# Patient Record
Sex: MALE | Race: WHITE | Employment: OTHER | URBAN - METROPOLITAN AREA
[De-identification: names, ages, dates, MRNs, and addresses within clinical notes are randomized per-mention and may not be internally consistent; named-entity substitution may affect disease eponyms.]

---

## 2018-11-18 ENCOUNTER — APPOINTMENT (OUTPATIENT)
Dept: CT IMAGING | Age: 58
DRG: 872 | End: 2018-11-18
Attending: INTERNAL MEDICINE
Payer: COMMERCIAL

## 2018-11-18 ENCOUNTER — APPOINTMENT (OUTPATIENT)
Dept: GENERAL RADIOLOGY | Age: 58
DRG: 872 | End: 2018-11-18
Attending: EMERGENCY MEDICINE
Payer: COMMERCIAL

## 2018-11-18 ENCOUNTER — HOSPITAL ENCOUNTER (INPATIENT)
Age: 58
LOS: 5 days | Discharge: HOME HEALTH | DRG: 872 | End: 2018-11-23
Attending: EMERGENCY MEDICINE | Admitting: INTERNAL MEDICINE
Payer: COMMERCIAL

## 2018-11-18 DIAGNOSIS — R68.89 RIGORS: Primary | ICD-10-CM

## 2018-11-18 PROBLEM — J18.9 PNEUMONIA: Status: ACTIVE | Noted: 2018-11-18

## 2018-11-18 PROBLEM — R65.10 SIRS (SYSTEMIC INFLAMMATORY RESPONSE SYNDROME) (HCC): Status: ACTIVE | Noted: 2018-11-18

## 2018-11-18 LAB
ALBUMIN SERPL-MCNC: 3.2 G/DL (ref 3.5–5)
ALBUMIN/GLOB SERPL: 0.7 {RATIO} (ref 1.1–2.2)
ALP SERPL-CCNC: 129 U/L (ref 45–117)
ALT SERPL-CCNC: 35 U/L (ref 12–78)
AMPHET UR QL SCN: NEGATIVE
ANION GAP SERPL CALC-SCNC: 6 MMOL/L (ref 5–15)
APPEARANCE UR: ABNORMAL
AST SERPL-CCNC: 24 U/L (ref 15–37)
BACTERIA URNS QL MICRO: NEGATIVE /HPF
BARBITURATES UR QL SCN: NEGATIVE
BASOPHILS # BLD: 0 K/UL (ref 0–0.1)
BASOPHILS NFR BLD: 0 % (ref 0–1)
BENZODIAZ UR QL: NEGATIVE
BILIRUB SERPL-MCNC: 0.4 MG/DL (ref 0.2–1)
BILIRUB UR QL: NEGATIVE
BUN SERPL-MCNC: 12 MG/DL (ref 6–20)
BUN/CREAT SERPL: 10 (ref 12–20)
CALCIUM SERPL-MCNC: 8.6 MG/DL (ref 8.5–10.1)
CANNABINOIDS UR QL SCN: NEGATIVE
CHLORIDE SERPL-SCNC: 104 MMOL/L (ref 97–108)
CO2 SERPL-SCNC: 30 MMOL/L (ref 21–32)
COCAINE UR QL SCN: NEGATIVE
COLOR UR: ABNORMAL
COMMENT, HOLDF: NORMAL
CREAT SERPL-MCNC: 1.23 MG/DL (ref 0.7–1.3)
DIFFERENTIAL METHOD BLD: ABNORMAL
DRUG SCRN COMMENT,DRGCM: NORMAL
EOSINOPHIL # BLD: 0.2 K/UL (ref 0–0.4)
EOSINOPHIL NFR BLD: 1 % (ref 0–7)
EPITH CASTS URNS QL MICRO: ABNORMAL /LPF
ERYTHROCYTE [DISTWIDTH] IN BLOOD BY AUTOMATED COUNT: 14.5 % (ref 11.5–14.5)
FLUAV AG NPH QL IA: NEGATIVE
FLUBV AG NOSE QL IA: NEGATIVE
GLOBULIN SER CALC-MCNC: 4.5 G/DL (ref 2–4)
GLUCOSE SERPL-MCNC: 105 MG/DL (ref 65–100)
GLUCOSE UR STRIP.AUTO-MCNC: NEGATIVE MG/DL
HCT VFR BLD AUTO: 47.1 % (ref 36.6–50.3)
HGB BLD-MCNC: 15.2 G/DL (ref 12.1–17)
HGB UR QL STRIP: NEGATIVE
IMM GRANULOCYTES # BLD: 0 K/UL (ref 0–0.04)
IMM GRANULOCYTES NFR BLD AUTO: 0 % (ref 0–0.5)
KETONES UR QL STRIP.AUTO: NEGATIVE MG/DL
LACTATE BLD-SCNC: 2.49 MMOL/L (ref 0.4–2)
LACTATE SERPL-SCNC: 2.2 MMOL/L (ref 0.4–2)
LEUKOCYTE ESTERASE UR QL STRIP.AUTO: NEGATIVE
LYMPHOCYTES # BLD: 1.1 K/UL (ref 0.8–3.5)
LYMPHOCYTES NFR BLD: 7 % (ref 12–49)
MCH RBC QN AUTO: 29.3 PG (ref 26–34)
MCHC RBC AUTO-ENTMCNC: 32.3 G/DL (ref 30–36.5)
MCV RBC AUTO: 90.9 FL (ref 80–99)
METHADONE UR QL: NEGATIVE
MONOCYTES # BLD: 0.2 K/UL (ref 0–1)
MONOCYTES NFR BLD: 1 % (ref 5–13)
NEUTS SEG # BLD: 14.2 K/UL (ref 1.8–8)
NEUTS SEG NFR BLD: 91 % (ref 32–75)
NITRITE UR QL STRIP.AUTO: NEGATIVE
NRBC # BLD: 0 K/UL (ref 0–0.01)
NRBC BLD-RTO: 0 PER 100 WBC
OPIATES UR QL: NEGATIVE
PCP UR QL: NEGATIVE
PH UR STRIP: 5 [PH] (ref 5–8)
PLATELET # BLD AUTO: 234 K/UL (ref 150–400)
PMV BLD AUTO: 9.1 FL (ref 8.9–12.9)
POTASSIUM SERPL-SCNC: 4.4 MMOL/L (ref 3.5–5.1)
PROT SERPL-MCNC: 7.7 G/DL (ref 6.4–8.2)
PROT UR STRIP-MCNC: 30 MG/DL
RBC # BLD AUTO: 5.18 M/UL (ref 4.1–5.7)
RBC #/AREA URNS HPF: ABNORMAL /HPF (ref 0–5)
RBC MORPH BLD: ABNORMAL
SAMPLES BEING HELD,HOLD: NORMAL
SODIUM SERPL-SCNC: 140 MMOL/L (ref 136–145)
SP GR UR REFRACTOMETRY: 1.02 (ref 1–1.03)
SPERM URNS QL MICRO: PRESENT
TSH SERPL DL<=0.05 MIU/L-ACNC: 1.43 UIU/ML (ref 0.36–3.74)
UA: UC IF INDICATED,UAUC: ABNORMAL
UROBILINOGEN UR QL STRIP.AUTO: 0.2 EU/DL (ref 0.2–1)
WBC # BLD AUTO: 15.7 K/UL (ref 4.1–11.1)
WBC URNS QL MICRO: ABNORMAL /HPF (ref 0–4)

## 2018-11-18 PROCEDURE — 81001 URINALYSIS AUTO W/SCOPE: CPT

## 2018-11-18 PROCEDURE — 84443 ASSAY THYROID STIM HORMONE: CPT

## 2018-11-18 PROCEDURE — 87040 BLOOD CULTURE FOR BACTERIA: CPT

## 2018-11-18 PROCEDURE — 36415 COLL VENOUS BLD VENIPUNCTURE: CPT

## 2018-11-18 PROCEDURE — 65660000000 HC RM CCU STEPDOWN

## 2018-11-18 PROCEDURE — 83605 ASSAY OF LACTIC ACID: CPT

## 2018-11-18 PROCEDURE — 71046 X-RAY EXAM CHEST 2 VIEWS: CPT

## 2018-11-18 PROCEDURE — 80307 DRUG TEST PRSMV CHEM ANLYZR: CPT

## 2018-11-18 PROCEDURE — 96365 THER/PROPH/DIAG IV INF INIT: CPT

## 2018-11-18 PROCEDURE — 87077 CULTURE AEROBIC IDENTIFY: CPT

## 2018-11-18 PROCEDURE — 87804 INFLUENZA ASSAY W/OPTIC: CPT

## 2018-11-18 PROCEDURE — 99285 EMERGENCY DEPT VISIT HI MDM: CPT

## 2018-11-18 PROCEDURE — 74011250636 HC RX REV CODE- 250/636: Performed by: EMERGENCY MEDICINE

## 2018-11-18 PROCEDURE — 85025 COMPLETE CBC W/AUTO DIFF WBC: CPT

## 2018-11-18 PROCEDURE — 96361 HYDRATE IV INFUSION ADD-ON: CPT

## 2018-11-18 PROCEDURE — 93005 ELECTROCARDIOGRAM TRACING: CPT

## 2018-11-18 PROCEDURE — 71275 CT ANGIOGRAPHY CHEST: CPT

## 2018-11-18 PROCEDURE — 74011000258 HC RX REV CODE- 258: Performed by: EMERGENCY MEDICINE

## 2018-11-18 PROCEDURE — 80053 COMPREHEN METABOLIC PANEL: CPT

## 2018-11-18 PROCEDURE — 87186 SC STD MICRODIL/AGAR DIL: CPT

## 2018-11-18 PROCEDURE — 74011636320 HC RX REV CODE- 636/320: Performed by: EMERGENCY MEDICINE

## 2018-11-18 RX ORDER — VANCOMYCIN 2 GRAM/500 ML IN 0.9 % SODIUM CHLORIDE INTRAVENOUS
2 ONCE
Status: DISCONTINUED | OUTPATIENT
Start: 2018-11-18 | End: 2018-11-18 | Stop reason: SDUPTHER

## 2018-11-18 RX ORDER — SODIUM CHLORIDE 0.9 % (FLUSH) 0.9 %
10 SYRINGE (ML) INJECTION
Status: COMPLETED | OUTPATIENT
Start: 2018-11-18 | End: 2018-11-19

## 2018-11-18 RX ORDER — ALBUTEROL SULFATE 0.83 MG/ML
1.25 SOLUTION RESPIRATORY (INHALATION)
Status: DISCONTINUED | OUTPATIENT
Start: 2018-11-18 | End: 2018-11-23 | Stop reason: HOSPADM

## 2018-11-18 RX ORDER — SODIUM CHLORIDE 9 MG/ML
50 INJECTION, SOLUTION INTRAVENOUS
Status: COMPLETED | OUTPATIENT
Start: 2018-11-18 | End: 2018-11-19

## 2018-11-18 RX ORDER — VANCOMYCIN 2 GRAM/500 ML IN 0.9 % SODIUM CHLORIDE INTRAVENOUS
2000
Status: COMPLETED | OUTPATIENT
Start: 2018-11-18 | End: 2018-11-19

## 2018-11-18 RX ORDER — ENOXAPARIN SODIUM 100 MG/ML
40 INJECTION SUBCUTANEOUS EVERY 12 HOURS
Status: DISCONTINUED | OUTPATIENT
Start: 2018-11-18 | End: 2018-11-23 | Stop reason: HOSPADM

## 2018-11-18 RX ORDER — ACETAMINOPHEN 325 MG/1
650 TABLET ORAL
Status: DISCONTINUED | OUTPATIENT
Start: 2018-11-18 | End: 2018-11-23 | Stop reason: HOSPADM

## 2018-11-18 RX ORDER — SODIUM CHLORIDE 0.9 % (FLUSH) 0.9 %
5-10 SYRINGE (ML) INJECTION AS NEEDED
Status: DISCONTINUED | OUTPATIENT
Start: 2018-11-18 | End: 2018-11-23

## 2018-11-18 RX ORDER — ONDANSETRON 2 MG/ML
4 INJECTION INTRAMUSCULAR; INTRAVENOUS
Status: DISCONTINUED | OUTPATIENT
Start: 2018-11-18 | End: 2018-11-23 | Stop reason: HOSPADM

## 2018-11-18 RX ORDER — SODIUM CHLORIDE 9 MG/ML
126 INJECTION, SOLUTION INTRAVENOUS CONTINUOUS
Status: DISCONTINUED | OUTPATIENT
Start: 2018-11-18 | End: 2018-11-23 | Stop reason: HOSPADM

## 2018-11-18 RX ADMIN — VANCOMYCIN HYDROCHLORIDE 2000 MG: 10 INJECTION, POWDER, LYOPHILIZED, FOR SOLUTION INTRAVENOUS at 23:55

## 2018-11-18 RX ADMIN — IOPAMIDOL 100 ML: 755 INJECTION, SOLUTION INTRAVENOUS at 23:55

## 2018-11-18 RX ADMIN — CEFTRIAXONE 1 G: 1 INJECTION, POWDER, FOR SOLUTION INTRAMUSCULAR; INTRAVENOUS at 22:46

## 2018-11-18 RX ADMIN — SODIUM CHLORIDE 1000 ML: 900 INJECTION, SOLUTION INTRAVENOUS at 22:47

## 2018-11-18 RX ADMIN — SODIUM CHLORIDE 1000 ML: 900 INJECTION, SOLUTION INTRAVENOUS at 21:59

## 2018-11-19 LAB
ALBUMIN SERPL-MCNC: 2.8 G/DL (ref 3.5–5)
ALBUMIN/GLOB SERPL: 0.7 {RATIO} (ref 1.1–2.2)
ALP SERPL-CCNC: 103 U/L (ref 45–117)
ALT SERPL-CCNC: 31 U/L (ref 12–78)
ANION GAP SERPL CALC-SCNC: 3 MMOL/L (ref 5–15)
AST SERPL-CCNC: 25 U/L (ref 15–37)
ATRIAL RATE: 88 BPM
BASOPHILS # BLD: 0.1 K/UL (ref 0–0.1)
BASOPHILS NFR BLD: 0 % (ref 0–1)
BILIRUB SERPL-MCNC: 0.4 MG/DL (ref 0.2–1)
BUN SERPL-MCNC: 11 MG/DL (ref 6–20)
BUN/CREAT SERPL: 10 (ref 12–20)
CALCIUM SERPL-MCNC: 7.6 MG/DL (ref 8.5–10.1)
CALCULATED P AXIS, ECG09: 28 DEGREES
CALCULATED R AXIS, ECG10: -11 DEGREES
CHLORIDE SERPL-SCNC: 109 MMOL/L (ref 97–108)
CO2 SERPL-SCNC: 27 MMOL/L (ref 21–32)
CREAT SERPL-MCNC: 1.07 MG/DL (ref 0.7–1.3)
CRP SERPL HS-MCNC: >9.5 MG/L
DIAGNOSIS, 93000: NORMAL
DIFFERENTIAL METHOD BLD: ABNORMAL
EOSINOPHIL # BLD: 0.2 K/UL (ref 0–0.4)
EOSINOPHIL NFR BLD: 1 % (ref 0–7)
ERYTHROCYTE [DISTWIDTH] IN BLOOD BY AUTOMATED COUNT: 14.8 % (ref 11.5–14.5)
ERYTHROCYTE [SEDIMENTATION RATE] IN BLOOD: 12 MM/HR (ref 0–20)
GLOBULIN SER CALC-MCNC: 4.2 G/DL (ref 2–4)
GLUCOSE SERPL-MCNC: 102 MG/DL (ref 65–100)
HCT VFR BLD AUTO: 44.1 % (ref 36.6–50.3)
HGB BLD-MCNC: 14.4 G/DL (ref 12.1–17)
IMM GRANULOCYTES # BLD: 0.1 K/UL (ref 0–0.04)
IMM GRANULOCYTES NFR BLD AUTO: 1 % (ref 0–0.5)
LACTATE SERPL-SCNC: 1.1 MMOL/L (ref 0.4–2)
LYMPHOCYTES # BLD: 1.5 K/UL (ref 0.8–3.5)
LYMPHOCYTES NFR BLD: 12 % (ref 12–49)
MCH RBC QN AUTO: 29.3 PG (ref 26–34)
MCHC RBC AUTO-ENTMCNC: 32.7 G/DL (ref 30–36.5)
MCV RBC AUTO: 89.6 FL (ref 80–99)
MONOCYTES # BLD: 0.7 K/UL (ref 0–1)
MONOCYTES NFR BLD: 5 % (ref 5–13)
NEUTS SEG # BLD: 10.6 K/UL (ref 1.8–8)
NEUTS SEG NFR BLD: 81 % (ref 32–75)
NRBC # BLD: 0 K/UL (ref 0–0.01)
NRBC BLD-RTO: 0 PER 100 WBC
P-R INTERVAL, ECG05: 132 MS
PLATELET # BLD AUTO: 204 K/UL (ref 150–400)
PMV BLD AUTO: 9.5 FL (ref 8.9–12.9)
POTASSIUM SERPL-SCNC: 4.2 MMOL/L (ref 3.5–5.1)
PROT SERPL-MCNC: 7 G/DL (ref 6.4–8.2)
Q-T INTERVAL, ECG07: 362 MS
QRS DURATION, ECG06: 132 MS
QTC CALCULATION (BEZET), ECG08: 438 MS
RBC # BLD AUTO: 4.92 M/UL (ref 4.1–5.7)
SODIUM SERPL-SCNC: 139 MMOL/L (ref 136–145)
VENTRICULAR RATE, ECG03: 88 BPM
WBC # BLD AUTO: 13 K/UL (ref 4.1–11.1)

## 2018-11-19 PROCEDURE — 80053 COMPREHEN METABOLIC PANEL: CPT

## 2018-11-19 PROCEDURE — 74011250636 HC RX REV CODE- 250/636: Performed by: INTERNAL MEDICINE

## 2018-11-19 PROCEDURE — 85025 COMPLETE CBC W/AUTO DIFF WBC: CPT

## 2018-11-19 PROCEDURE — 94760 N-INVAS EAR/PLS OXIMETRY 1: CPT

## 2018-11-19 PROCEDURE — 36415 COLL VENOUS BLD VENIPUNCTURE: CPT

## 2018-11-19 PROCEDURE — 83605 ASSAY OF LACTIC ACID: CPT

## 2018-11-19 PROCEDURE — 74011250636 HC RX REV CODE- 250/636: Performed by: EMERGENCY MEDICINE

## 2018-11-19 PROCEDURE — 65660000000 HC RM CCU STEPDOWN

## 2018-11-19 PROCEDURE — 85652 RBC SED RATE AUTOMATED: CPT

## 2018-11-19 PROCEDURE — 74011636320 HC RX REV CODE- 636/320: Performed by: EMERGENCY MEDICINE

## 2018-11-19 PROCEDURE — 86141 C-REACTIVE PROTEIN HS: CPT

## 2018-11-19 PROCEDURE — 74011000258 HC RX REV CODE- 258: Performed by: INTERNAL MEDICINE

## 2018-11-19 RX ORDER — IBUPROFEN 200 MG
CAPSULE ORAL
COMMUNITY
End: 2018-11-23

## 2018-11-19 RX ORDER — VANCOMYCIN 1.75 GRAM/500 ML IN 0.9 % SODIUM CHLORIDE INTRAVENOUS
1750
Status: DISCONTINUED | OUTPATIENT
Start: 2018-11-19 | End: 2018-11-21

## 2018-11-19 RX ADMIN — SODIUM CHLORIDE 125 ML/HR: 900 INJECTION, SOLUTION INTRAVENOUS at 17:05

## 2018-11-19 RX ADMIN — SODIUM CHLORIDE 50 ML/HR: 900 INJECTION, SOLUTION INTRAVENOUS at 00:37

## 2018-11-19 RX ADMIN — VANCOMYCIN HYDROCHLORIDE 1750 MG: 10 INJECTION, POWDER, LYOPHILIZED, FOR SOLUTION INTRAVENOUS at 09:21

## 2018-11-19 RX ADMIN — Medication 10 ML: at 00:37

## 2018-11-19 RX ADMIN — ENOXAPARIN SODIUM 40 MG: 40 INJECTION, SOLUTION INTRAVENOUS; SUBCUTANEOUS at 00:02

## 2018-11-19 RX ADMIN — ENOXAPARIN SODIUM 40 MG: 40 INJECTION, SOLUTION INTRAVENOUS; SUBCUTANEOUS at 22:50

## 2018-11-19 RX ADMIN — CEFTRIAXONE 1 G: 1 INJECTION, POWDER, FOR SOLUTION INTRAMUSCULAR; INTRAVENOUS at 22:21

## 2018-11-19 RX ADMIN — SODIUM CHLORIDE 125 ML/HR: 900 INJECTION, SOLUTION INTRAVENOUS at 01:38

## 2018-11-19 RX ADMIN — ENOXAPARIN SODIUM 40 MG: 40 INJECTION, SOLUTION INTRAVENOUS; SUBCUTANEOUS at 12:54

## 2018-11-19 RX ADMIN — SODIUM CHLORIDE 125 ML/HR: 900 INJECTION, SOLUTION INTRAVENOUS at 12:57

## 2018-11-19 NOTE — PROGRESS NOTES
Hospitalist Progress Note NAME: Mirian Talley :  1960 MRN:  [id number] This patient is at above high risk of deterioration based on documented presenting clinical data, comorbid conditions, high risk of adverse events and current acute care course. Assessment / Plan: 
SIRS, unclear etiology 
-recent dental work places him at risk for bacteremia and the presence of rigors worry for IE is real 
-follow cultures -LENKA prn 
-lactic dropping to <2 but CRP was >9.5 -up oob to moving 
-follow carefully 
-noted normal cta chest, cxr 
-ecg with RBBB 
-culture if fevers return. Code status: Full Prophylaxis: Lovenox Recommended Disposition: Home w/Family Medical Decision Making Today · Acute or chronic illness that poses a threat to life or bodily function · I have reviewed the flowsheet and previous days notes · One or more chronic illnesses with severe exacerbation, progression or side effects of treatment · Review and order of Clinical lab tests · Discuss case with Specialist MD 
 
Subjective: Chief Complaint / Reason for Physician Visit \"I am feeling well\". Discussed with RN events overnight. No further chills, rigors. No fevers/chills/n/v/d. Review of Systems: 
Symptom Y/N Comments  Symptom Y/N Comments Fever/Chills n   Chest Pain n   
Poor Appetite n   Edema Cough n   Abdominal Pain n   
Sputum    Joint Pain SOB/SOLORZANO n   Pruritis/Rash Nausea/vomit n   Tolerating PT/OT Diarrhea n   Tolerating Diet y Constipation    Other Could NOT obtain due to:   
 
Objective: VITALS:  
Last 24hrs VS reviewed since prior progress note. Most recent are: 
Patient Vitals for the past 24 hrs: 
 Temp Pulse Resp BP SpO2  
18 1624 98.7 °F (37.1 °C) 79 18 128/64 98 % 18 0755 98.8 °F (37.1 °C) 80 19 110/47 97 % 18 0537  81 18  99 % 18 0530  77 19  99 % 18 0430  71 14  100 % 18 0400  75 19  98 % 11/19/18 0300  70 19 101/46 97 % 11/19/18 0200    111/74 98 % 11/19/18 0145 97.8 °F (36.6 °C)   109/51 98 % 11/19/18 0144     99 % 11/19/18 0143 98 °F (36.7 °C)   105/58 99 % 11/18/18 2259 98.4 °F (36.9 °C)   120/78   
11/18/18 2125  98   98 % 11/18/18 2115 99.3 °F (37.4 °C)   132/82  Intake/Output Summary (Last 24 hours) at 11/19/2018 1656 Last data filed at 11/19/2018 1254 Gross per 24 hour Intake 3133.75 ml Output  Net 3133.75 ml PHYSICAL EXAM: 
General: WD, obese m Alert, cooperative, no acute distress   
EENT:  EOMI. Anicteric sclerae. MM dry Resp:  CTA bilaterally, no wheezing or rales. No accessory muscle use CV:  Regular  rhythm,  No edema GI:  Soft, Non distended, Non tender.  +Bowel sounds Neurologic:  Alert and oriented X 3, normal speech Psych:   Good insight. Not anxious nor agitated Skin:  no rashes or ulcers. No jaundice Reviewed most current lab test results and cultures  YES Reviewed most current radiology test results   YES Review and summation of old records today    NO Reviewed patient's current orders and MAR    YES 
PMH/SH reviewed - no change compared to H&P 
________________________________________________________________________ Care Plan discussed with: 
  Comments Patient x Discussed with patient in room. POC discussed. Questions answered (25 Family RN x 5 Care Manager Consultant:    
                  Multidiciplinary team rounds were held today with , nursing, pharmacist and clinical coordinator. Patient's plan of care was discussed; medications were reviewed and discharge planning was addressed. ________________________________________________________________________ Total NON critical care TIME:  35   Minutes Total CRITICAL CARE TIME Spent:   Minutes non procedure based. I have provided critical care time.   During this entire length of time I was immediately available to the patient. The reason for providing this level of medical care was due to a critical illness that impaired one or more vital organ systems, such that there was a high probability of imminent or life threatening deterioration in the patient's condition. This care involved high complexity decision making which includes reviewing the patient's past medical records, current laboratory results, and actual Xray films in order to assess, support vital system function, and to treat this degree of vital organ system failure, and to prevent further life threatening deterioration of the patients condition. Comments >50% of visit spent in counseling and coordination of care x See above  
________________________________________________________________________ Procedures: see electronic medical records for all procedures/Xrays and details which were not copied into this note but were reviewed prior to creation of Plan. LABS: 
Recent Labs 11/19/18 
0350 11/18/18 
1627 WBC 13.0* 15.7* HGB 14.4 15.2 HCT 44.1 47.1  234 Recent Labs 11/19/18 
0350 11/18/18 
1627  140  
K 4.2 4.4  
* 104 CO2 27 30 BUN 11 12 CREA 1.07 1.23  
* 105* CA 7.6* 8.6 Recent Labs 11/19/18 
0350 11/18/18 
1627 SGOT 25 24 ALT 31 35  129* TBILI 0.4 0.4 TP 7.0 7.7 ALB 2.8* 3.2*  
GLOB 4.2* 4.5* No results for input(s): INR, PTP, APTT in the last 72 hours. No lab exists for component: INREXT No results for input(s): FE, TIBC, PSAT, FERR in the last 72 hours. No results found for: FOL, RBCF No results for input(s): PH, PCO2, PO2 in the last 72 hours. No results for input(s): PHI, PO2I, PCO2I in the last 72 hours. No results for input(s): CPK, CKNDX, TROIQ in the last 72 hours. No lab exists for component: CPKMB No results found for: CHOL, CHOLX, CHLST, CHOLV, HDL, LDL, LDLC, DLDLP, TGLX, TRIGL, TRIGP, CHHD, CHHDX No results found for: Alexis Barnard Lab Results Component Value Date/Time Color YELLOW/STRAW 11/18/2018 04:15 PM  
 Appearance CLOUDY (A) 11/18/2018 04:15 PM  
 Specific gravity 1.016 11/18/2018 04:15 PM  
 pH (UA) 5.0 11/18/2018 04:15 PM  
 Protein 30 (A) 11/18/2018 04:15 PM  
 Glucose NEGATIVE  11/18/2018 04:15 PM  
 Ketone NEGATIVE  11/18/2018 04:15 PM  
 Bilirubin NEGATIVE  11/18/2018 04:15 PM  
 Urobilinogen 0.2 11/18/2018 04:15 PM  
 Nitrites NEGATIVE  11/18/2018 04:15 PM  
 Leukocyte Esterase NEGATIVE  11/18/2018 04:15 PM  
 Epithelial cells FEW 11/18/2018 04:15 PM  
 Bacteria NEGATIVE  11/18/2018 04:15 PM  
 WBC 0-4 11/18/2018 04:15 PM  
 RBC 0-5 11/18/2018 04:15 PM  
 
 
RADIOGRAPHIC STUDIES: 
CXR Results  (Last 48 hours)  
          
 11/18/18 2014  XR CHEST PA LAT Final result Impression:  IMPRESSION:  
   
Nonspecific opacity in the lateral aspect of the lower right lung may represent  
a small amount of pleural fluid or pleural thickening. There is no focal  
airspace opacity. Narrative:  EXAM:  XR CHEST PA LAT INDICATION:  Shaking chills. COMPARISON: 2/6/2007 TECHNIQUE: PA and lateral chest views FINDINGS: The cardiomediastinal contours are stable. The pulmonary vasculature  
is within normal limits. There is nonspecific opacity in the lateral aspect of the right lower lung. The  
lungs and pleural spaces are otherwise clear. There is no pneumothorax. The  
visualized bones and upper abdomen are age-appropriate. CT Results  (Last 48 hours)  
          
 11/19/18 0037  CTA CHEST W OR W WO CONT Final result Impression:  IMPRESSION:   
1. No acute pulmonary embolism or other acute abnormality in the chest. A benign  
right pleural reflection account the peripheral density seen on chest  
radiograph. 2. Hepatic steatosis. Narrative:  EXAM:  CT angiography chest  
   
INDICATION:  Chills and shaking all over. COMPARISON: Chest radiographs 11/18/2018 TECHNIQUE: Helical thin section chest CT following uneventful intravenous  
administration of nonionic contrast according to departmental PE protocol. Coronal and sagittal reformats were performed. 3D/MIP post processing was  
performed. CT dose reduction was achieved through use of a standardized protocol  
tailored for this examination and automatic exposure control for dose  
modulation. FINDINGS: This is a good quality study for the evaluation of pulmonary embolism  
to the first subsegmental arterial level. There is no pulmonary embolism to this  
level. The visualized thyroid gland is unremarkable. The aorta and main pulmonary  
artery are normal in caliber. Cardiac size is within normal limits. No  
pericardial effusion. No lymphadenopathy by imaging size criteria. A benign right pleural reflection account for the peripheral density seen on  
chest radiograph. There is no suspicious lung nodule, mass, or consolidation. There is no pleural effusion or pneumothorax. The central airways are clear. The liver is diffusely low in attenuation. There is no acute abnormality in the  
visualized upper abdomen. There are degenerative changes in the spine. An  
hemangioma is noted in the T7 vertebral body. Echo Results  (Last 48 hours) None VENOUS DOPPLER results  (Last 48 hours) None CULTURES: 
 
No results found for: SDES Lab Results Component Value Date/Time Culture result: (A) 11/18/2018 09:48 PM  
  GRAM NEGATIVE RODS GROWING IN 2 OF 4 BOTTLES DRAWN (SITES=L HAND AND L AC) Culture result: REMAINING BOTTLE(S) HAS/HAVE NO GROWTH SO FAR 11/18/2018 09:48 PM  
  
 
 
Signed: Fredis Mary MD 
 
This note will not be viewable in [address]

## 2018-11-19 NOTE — CONSULTS
Consult    NAME: Brent Desai   :  1960   MRN:  [id number]     Date/Time:  2018 11:32 AM    Patient PCP: Chares Cockayne, MD  ________________________________________________________________________     Assessment:     1. Recent dental work last week, rigors, leukocytosis, +blood culture  2. Prior negative stress test  3. Sinus with RBBB, left axis  4. , retired , walks        Plan:     Recent dental work, bacteremia asked to perform LENKA. All risk/benefits/alternatives discussed with patient and agrees to proceed. [x]        High complexity decision making was performed        Subjective:   CHIEF COMPLAINT: Rigors    HISTORY OF PRESENT ILLNESS:       Colt Jones is a 60 y.o.  male presents ambulatory to the ED with cc of gradual onset chills and tremors, x day while at home-.pt takes no meds. Otherwise with no sig past medical history. no history of drug use.non smoker. Non drinker.    Pt reports that tremors were \"uncontrolled\"  And lasted for ~30 to 45 minutes.  Pt reports that he was at baseline prior to episode.    Pt reports that he is currently fatigued by tremor episode.  Pt reports during episode he was having diaphoresis and SOB. denies HA, or nausea. Pt reports having dental fillings done on 2018.  Pt denies fever, hx of blood clots, or recent travel. No c/p no   No uti sx.      Currently in stretcher, feels ok, no chest pain, no dyspnea, no edema. We were asked to consult for work up and evaluation of the above problems. No past medical history on file. No past surgical history on file. No Known Allergies   Meds:  See below  Social History     Tobacco Use    Smoking status: Not on file   Substance Use Topics    Alcohol use: Not on file      No family history on file. REVIEW OF SYSTEMS:     []         Unable to obtain  ROS due to ---   [x]         Total of 12 systems reviewed as follows:     Total of 12 systems reviewed as follows:       POSITIVE= Bold text  Negative = normal text  General:  fever, chills, sweats, generalized weakness, weight loss/gain,      loss of appetite   Eyes:    blurred vision, eye pain, loss of vision, double vision  ENT:    rhinorrhea, pharyngitis   Respiratory:   cough, sputum production, SOB, SOLORZANO, wheezing, pleuritic pain   Cardiology:   chest pain, palpitations, orthopnea, PND, edema, syncope   Gastrointestinal:  abdominal pain , N/V, diarrhea, dysphagia, constipation, bleeding   Genitourinary:  frequency, urgency, dysuria, hematuria, incontinence   Muskuloskeletal :  arthralgia, myalgia, back pain  Hematology:  easy bruising, nose or gum bleeding, lymphadenopathy   Dermatological: rash, ulceration, pruritis, color change / jaundice  Endocrine:   hot flashes or polydipsia   Neurological:  headache, dizziness, confusion, focal weakness, paresthesia,     Speech difficulties, memory loss, gait difficulty  Psychological: Feelings of anxiety, depression, agitation    Objective:      Physical Exam:    Last 24hrs VS reviewed since prior progress note. Most recent are:    Visit Vitals  /47 (BP 1 Location: Right arm, BP Patient Position: At rest)   Pulse 80   Temp 98.8 °F (37.1 °C)   Resp 19   Ht 5' 6\" (1.676 m)   Wt 137.8 kg (303 lb 12.7 oz)   SpO2 97%   BMI 49.03 kg/m²       Intake/Output Summary (Last 24 hours) at 11/19/2018 1132  Last data filed at 11/19/2018 5240  Gross per 24 hour   Intake 2893.75 ml   Output    Net 2893.75 ml        General Appearance: Well developed, well nourished, alert & oriented x 3,    no acute distress. Ears/Nose/Mouth/Throat: Pupils equal and round, Hearing grossly normal.  Neck: Supple. JVP within normal limits. Carotids good upstrokes, with no bruit. Chest: Lungs clear to auscultation bilaterally. Cardiovascular: Regular rate and rhythm, S1S2 normal, no murmur, rubs, gallops. Abdomen: Soft, non-tender, bowel sounds are active. No organomegaly.   Extremities: No edema bilaterally. Femoral pulses +2, Distal Pulses +1. Skin: Warm and dry. Neuro: CN II-XII grossly intact, Strength and sensation grossly intact. Data:      Prior to Admission medications    Not on File       Recent Results (from the past 24 hour(s))   URINALYSIS W/ REFLEX CULTURE    Collection Time: 11/18/18  4:15 PM   Result Value Ref Range    Color YELLOW/STRAW      Appearance CLOUDY (A) CLEAR      Specific gravity 1.016 1.003 - 1.030      pH (UA) 5.0 5.0 - 8.0      Protein 30 (A) NEG mg/dL    Glucose NEGATIVE  NEG mg/dL    Ketone NEGATIVE  NEG mg/dL    Bilirubin NEGATIVE  NEG      Blood NEGATIVE  NEG      Urobilinogen 0.2 0.2 - 1.0 EU/dL    Nitrites NEGATIVE  NEG      Leukocyte Esterase NEGATIVE  NEG      WBC 0-4 0 - 4 /hpf    RBC 0-5 0 - 5 /hpf    Epithelial cells FEW FEW /lpf    Bacteria NEGATIVE  NEG /hpf    UA:UC IF INDICATED CULTURE NOT INDICATED BY UA RESULT CNI      Spermatozoa PRESENT     CBC WITH AUTOMATED DIFF    Collection Time: 11/18/18  4:27 PM   Result Value Ref Range    WBC 15.7 (H) 4.1 - 11.1 K/uL    RBC 5.18 4.10 - 5.70 M/uL    HGB 15.2 12.1 - 17.0 g/dL    HCT 47.1 36.6 - 50.3 %    MCV 90.9 80.0 - 99.0 FL    MCH 29.3 26.0 - 34.0 PG    MCHC 32.3 30.0 - 36.5 g/dL    RDW 14.5 11.5 - 14.5 %    PLATELET 300 360 - 606 K/uL    MPV 9.1 8.9 - 12.9 FL    NRBC 0.0 0  WBC    ABSOLUTE NRBC 0.00 0.00 - 0.01 K/uL    NEUTROPHILS 91 (H) 32 - 75 %    LYMPHOCYTES 7 (L) 12 - 49 %    MONOCYTES 1 (L) 5 - 13 %    EOSINOPHILS 1 0 - 7 %    BASOPHILS 0 0 - 1 %    IMMATURE GRANULOCYTES 0 0.0 - 0.5 %    ABS. NEUTROPHILS 14.2 (H) 1.8 - 8.0 K/UL    ABS. LYMPHOCYTES 1.1 0.8 - 3.5 K/UL    ABS. MONOCYTES 0.2 0.0 - 1.0 K/UL    ABS. EOSINOPHILS 0.2 0.0 - 0.4 K/UL    ABS. BASOPHILS 0.0 0.0 - 0.1 K/UL    ABS. IMM.  GRANS. 0.0 0.00 - 0.04 K/UL    DF AUTOMATED      RBC COMMENTS NORMOCYTIC, NORMOCHROMIC     METABOLIC PANEL, COMPREHENSIVE    Collection Time: 11/18/18  4:27 PM   Result Value Ref Range    Sodium 140 136 - 145 mmol/L    Potassium 4.4 3.5 - 5.1 mmol/L    Chloride 104 97 - 108 mmol/L    CO2 30 21 - 32 mmol/L    Anion gap 6 5 - 15 mmol/L    Glucose 105 (H) 65 - 100 mg/dL    BUN 12 6 - 20 MG/DL    Creatinine 1.23 0.70 - 1.30 MG/DL    BUN/Creatinine ratio 10 (L) 12 - 20      GFR est AA >60 >60 ml/min/1.73m2    GFR est non-AA >60 >60 ml/min/1.73m2    Calcium 8.6 8.5 - 10.1 MG/DL    Bilirubin, total 0.4 0.2 - 1.0 MG/DL    ALT (SGPT) 35 12 - 78 U/L    AST (SGOT) 24 15 - 37 U/L    Alk. phosphatase 129 (H) 45 - 117 U/L    Protein, total 7.7 6.4 - 8.2 g/dL    Albumin 3.2 (L) 3.5 - 5.0 g/dL    Globulin 4.5 (H) 2.0 - 4.0 g/dL    A-G Ratio 0.7 (L) 1.1 - 2.2     TSH 3RD GENERATION    Collection Time: 11/18/18  4:27 PM   Result Value Ref Range    TSH 1.43 0.36 - 3.74 uIU/mL   INFLUENZA A & B AG (RAPID TEST)    Collection Time: 11/18/18  8:00 PM   Result Value Ref Range    Influenza A Antigen NEGATIVE  NEG      Influenza B Antigen NEGATIVE  NEG     POC LACTIC ACID    Collection Time: 11/18/18  9:42 PM   Result Value Ref Range    Lactic Acid (POC) 2.49 (HH) 0.40 - 2.00 mmol/L   CULTURE, BLOOD, PAIRED    Collection Time: 11/18/18  9:48 PM   Result Value Ref Range    Special Requests: NO SPECIAL REQUESTS      Culture result:        ONE OF FOUR BOTTLES HAS BEEN FLAGGED POSITIVE BY INSTRUMENT. BOTTLE HAS BEEN SENT TO [facility] TO ASSESS FOR POSSIBLE GROWTH. REMAINING BOTTLE(S) HAS/HAVE NO GROWTH SO FAR   LACTIC ACID    Collection Time: 11/18/18  9:57 PM   Result Value Ref Range    Lactic acid 2.2 (HH) 0.4 - 2.0 MMOL/L   SAMPLES BEING HELD    Collection Time: 11/18/18  9:59 PM   Result Value Ref Range    SAMPLES BEING HELD RED TOP     COMMENT        Add-on orders for these samples will be processed based on acceptable specimen integrity and analyte stability, which may vary by analyte.    EKG, 12 LEAD, INITIAL    Collection Time: 11/18/18 10:22 PM   Result Value Ref Range    Ventricular Rate 88 BPM    Atrial Rate 88 BPM    P-R Interval 132 ms    QRS Duration 132 ms    Q-T Interval 362 ms    QTC Calculation (Bezet) 438 ms    Calculated P Axis 28 degrees    Calculated R Axis -11 degrees    Diagnosis       Normal sinus rhythm  Right bundle branch block  No previous ECGs available  Confirmed by Anahy Gaona ([id number]) on 11/19/2018 9:12:05 AM     DRUG SCREEN, URINE    Collection Time: 11/18/18 10:59 PM   Result Value Ref Range    AMPHETAMINES NEGATIVE  NEG      BARBITURATES NEGATIVE  NEG      BENZODIAZEPINES NEGATIVE  NEG      COCAINE NEGATIVE  NEG      METHADONE NEGATIVE  NEG      OPIATES NEGATIVE  NEG      PCP(PHENCYCLIDINE) NEGATIVE  NEG      THC (TH-CANNABINOL) NEGATIVE  NEG      Drug screen comment (NOTE)    LACTIC ACID    Collection Time: 11/19/18  1:36 AM   Result Value Ref Range    Lactic acid 1.1 0.4 - 2.0 MMOL/L   CRP, HIGH SENSITIVITY    Collection Time: 11/19/18  1:36 AM   Result Value Ref Range    CRP, High sensitivity >9.5 mg/L   SED RATE (ESR)    Collection Time: 11/19/18  1:36 AM   Result Value Ref Range    Sed rate, automated 12 0 - 20 mm/hr   METABOLIC PANEL, COMPREHENSIVE    Collection Time: 11/19/18  3:50 AM   Result Value Ref Range    Sodium 139 136 - 145 mmol/L    Potassium 4.2 3.5 - 5.1 mmol/L    Chloride 109 (H) 97 - 108 mmol/L    CO2 27 21 - 32 mmol/L    Anion gap 3 (L) 5 - 15 mmol/L    Glucose 102 (H) 65 - 100 mg/dL    BUN 11 6 - 20 MG/DL    Creatinine 1.07 0.70 - 1.30 MG/DL    BUN/Creatinine ratio 10 (L) 12 - 20      GFR est AA >60 >60 ml/min/1.73m2    GFR est non-AA >60 >60 ml/min/1.73m2    Calcium 7.6 (L) 8.5 - 10.1 MG/DL    Bilirubin, total 0.4 0.2 - 1.0 MG/DL    ALT (SGPT) 31 12 - 78 U/L    AST (SGOT) 25 15 - 37 U/L    Alk.  phosphatase 103 45 - 117 U/L    Protein, total 7.0 6.4 - 8.2 g/dL    Albumin 2.8 (L) 3.5 - 5.0 g/dL    Globulin 4.2 (H) 2.0 - 4.0 g/dL    A-G Ratio 0.7 (L) 1.1 - 2.2     CBC WITH AUTOMATED DIFF    Collection Time: 11/19/18  3:50 AM   Result Value Ref Range    WBC 13.0 (H) 4.1 - 11.1 K/uL    RBC 4.92 4.10 - 5.70 M/uL    HGB 14.4 12.1 - 17.0 g/dL    HCT 44.1 36.6 - 50.3 %    MCV 89.6 80.0 - 99.0 FL    MCH 29.3 26.0 - 34.0 PG    MCHC 32.7 30.0 - 36.5 g/dL    RDW 14.8 (H) 11.5 - 14.5 %    PLATELET 725 655 - 013 K/uL    MPV 9.5 8.9 - 12.9 FL    NRBC 0.0 0  WBC    ABSOLUTE NRBC 0.00 0.00 - 0.01 K/uL    NEUTROPHILS 81 (H) 32 - 75 %    LYMPHOCYTES 12 12 - 49 %    MONOCYTES 5 5 - 13 %    EOSINOPHILS 1 0 - 7 %    BASOPHILS 0 0 - 1 %    IMMATURE GRANULOCYTES 1 (H) 0.0 - 0.5 %    ABS. NEUTROPHILS 10.6 (H) 1.8 - 8.0 K/UL    ABS. LYMPHOCYTES 1.5 0.8 - 3.5 K/UL    ABS. MONOCYTES 0.7 0.0 - 1.0 K/UL    ABS. EOSINOPHILS 0.2 0.0 - 0.4 K/UL    ABS. BASOPHILS 0.1 0.0 - 0.1 K/UL    ABS. IMM.  GRANS. 0.1 (H) 0.00 - 0.04 K/UL    DF AUTOMATED

## 2018-11-19 NOTE — PROGRESS NOTES
Bedside shift change report given to Etienne Bautista (oncoming nurse) by Fredis Rudolph (offgoing nurse). Report included the following information SBAR, Kardex, ED Summary, Procedure Summary, Intake/Output, MAR and Cardiac Rhythm NSR right BBB.

## 2018-11-19 NOTE — PROGRESS NOTES
TRANSFER - IN REPORT: 
 
Verbal report received from Stephen Churchill RN(name) on Calla Loss  being received from ER(unit) for routine progression of care Report consisted of patients Situation, Background, Assessment and  
Recommendations(SBAR). Information from the following report(s) SBAR, Kardex, Intake/Output, MAR and Recent Results was reviewed with the receiving nurse. Opportunity for questions and clarification was provided. Assessment completed upon patients arrival to unit and care assumed.

## 2018-11-19 NOTE — H&P
Hospitalist Admission NoteNAME: Viral Peace :  1960 MRN:  [id number] Date/Time:  2018 10:43 PM 
 
Patient PCP: Leonarda Bragg MD 
______________________________________________________________________ Given the patient's current clinical presentation, I have a high level of concern for decompensation if discharged from the emergency department. Complex decision making was performed, which includes reviewing the patient's available past medical records, laboratory results, and x-ray films. My assessment of this patient's clinical condition and my plan of care is as follows. Assessment / Plan: 
 
Chills:with sirs cxr Nonspecific opacity in the lateral aspect of the lower right lung may representa small amount of pleural fluid or pleural thickening. There is no focal 
airspace opacity. 
-u/a  Clear/ no sx Lactic acid 2.49 Blood cx done in the er x2 Get cta r/o pe /penumonia Cont w vanco/rocephin for now Get howard /cardiology to see since er raised the possibility of IE- recent dental w/u Trend la/follow blood cx Tylenol prn Check ua tox Code Status: Full Code Surrogate Decision Maker: DVT Prophylaxis: Lovenox GI Prophylaxis: not indicated Baseline: Ambulatory Subjective: CHIEF COMPLAINT: chills HISTORY OF PRESENT ILLNESS:    
Fran Agudelo is a 60 y.o.  male presents ambulatory to the ED with cc of gradual onset chills and tremors, x day while at home-.pt takes no meds. Otherwise with no sig past medical history. no history of drug use.non smoker. Non drinker. Pt reports that tremors were \"uncontrolled\"  And lasted for ~30 to 45 minutes. Pt reports that he was at baseline prior to episode. Pt reports that he is currently fatigued by tremor episode. Pt reports during episode he was having diaphoresis and SOB. denies HA, or nausea.  Pt reports having dental fillings done on 11/17/2018. Pt denies fever, hx of blood clots, or recent travel. No c/p no No uti sx. We were asked to admit for work up and evaluation of the above problems. No past medical history on file. No past surgical history on file. Social History Tobacco Use  Smoking status: Not on file Substance Use Topics  Alcohol use: Not on file No family history on file. No Known Allergies Prior to Admission medications Not on File REVIEW OF SYSTEMS:    
I am not able to complete the review of systems because: The patient is intubated and sedated The patient has altered mental status due to his acute medical problems The patient has baseline aphasia from prior stroke(s) The patient has baseline dementia and is not reliable historian The patient is in acute medical distress and unable to provide information Total of 12 systems reviewed as follows:   
   POSITIVE= underlined text  Negative = text not underlined General:  fever, chills, sweats, generalized weakness, weight loss/gain,  
   loss of appetite Eyes:    blurred vision, eye pain, loss of vision, double vision ENT:    rhinorrhea, pharyngitis Respiratory:   cough, sputum production, SOB, SOLORZANO, wheezing, pleuritic pain  
Cardiology:   chest pain, palpitations, orthopnea, PND, edema, syncope Gastrointestinal:  abdominal pain , N/V, diarrhea, dysphagia, constipation, bleeding Genitourinary:  frequency, urgency, dysuria, hematuria, incontinence Muskuloskeletal :  arthralgia, myalgia, back pain Hematology:  easy bruising, nose or gum bleeding, lymphadenopathy Dermatological: rash, ulceration, pruritis, color change / jaundice Endocrine:   hot flashes or polydipsia Neurological:  headache, dizziness, confusion, focal weakness, paresthesia, Speech difficulties, memory loss, gait difficulty Psychological: Feelings of anxiety, depression, agitation Objective: VITALS:   
 Visit Vitals /82 (BP 1 Location: Left arm, BP Patient Position: Sitting) Pulse 98 Temp 99.3 °F (37.4 °C) Resp 18 Ht 5' 6\" (1.676 m) Wt 137.8 kg (303 lb 12.7 oz) SpO2 98% BMI 49.03 kg/m² PHYSICAL EXAM: 
 
General:    Alert, OBESE  cooperative, no distress, appears stated age. HEENT: Atraumatic, anicteric sclerae, pink conjunctivae no lesions No oral ulcers, mucosa moist, throat clear, dentition fair Neck:  Supple, symmetrical,  thyroid: non tender no meningismus Lungs:   Clear to auscultation bilaterally. No Wheezing or Rhonchi. No rales. Chest wall:  No tenderness  No Accessory muscle use. Heart:   Tachy  rhythm,  No  murmur   No edema Abdomen:   Soft, non-tender. Not distended. Bowel sounds normal 
Extremities: No cyanosis. No clubbing,   
  Skin turgor normal, Capillary refill normal, Radial dial pulse 2+ Skin:     Not pale. Not Jaundiced  No rashes  No hemorrhages in nail beds. No lesions on hands/toes- 
Psych:  Good insight. Not depressed. Not anxious or agitated. Neurologic: EOMs intact. No facial asymmetry. No aphasia or slurred speech. Symmetrical strength, Sensation grossly intact. Alert and oriented X 4.  
 
_______________________________________________________________________ Care Plan discussed with: 
  Comments Patient x Family RN x Care Manager Consultant:  x   
_______________________________________________________________________ Expected  Disposition:  
Home with Family x HH/PT/OT/RN   
SNF/LTC   
JESSI   
________________________________________________________________________ TOTAL TIME:  90  Minutes Critical Care Provided     Minutes non procedure based Comments  
 xx Reviewed previous records  
>50% of visit spent in counseling and coordination of care x Discussion with patient and/or family and questions answered 
  
 
________________________________________________________________________ Signed: Neno Haynes MD 
 
Procedures: see electronic medical records for all procedures/Xrays and details which were not copied into this note but were reviewed prior to creation of Plan. LAB DATA REVIEWED:   
Recent Results (from the past 24 hour(s)) URINALYSIS W/ REFLEX CULTURE Collection Time: 11/18/18  4:15 PM  
Result Value Ref Range Color YELLOW/STRAW Appearance CLOUDY (A) CLEAR Specific gravity 1.016 1.003 - 1.030    
 pH (UA) 5.0 5.0 - 8.0 Protein 30 (A) NEG mg/dL Glucose NEGATIVE  NEG mg/dL Ketone NEGATIVE  NEG mg/dL Bilirubin NEGATIVE  NEG Blood NEGATIVE  NEG Urobilinogen 0.2 0.2 - 1.0 EU/dL Nitrites NEGATIVE  NEG Leukocyte Esterase NEGATIVE  NEG    
 WBC 0-4 0 - 4 /hpf  
 RBC 0-5 0 - 5 /hpf Epithelial cells FEW FEW /lpf Bacteria NEGATIVE  NEG /hpf  
 UA:UC IF INDICATED CULTURE NOT INDICATED BY UA RESULT CNI Spermatozoa PRESENT    
CBC WITH AUTOMATED DIFF Collection Time: 11/18/18  4:27 PM  
Result Value Ref Range WBC 15.7 (H) 4.1 - 11.1 K/uL  
 RBC 5.18 4.10 - 5.70 M/uL  
 HGB 15.2 12.1 - 17.0 g/dL HCT 47.1 36.6 - 50.3 % MCV 90.9 80.0 - 99.0 FL  
 MCH 29.3 26.0 - 34.0 PG  
 MCHC 32.3 30.0 - 36.5 g/dL  
 RDW 14.5 11.5 - 14.5 % PLATELET 692 650 - 361 K/uL MPV 9.1 8.9 - 12.9 FL  
 NRBC 0.0 0  WBC ABSOLUTE NRBC 0.00 0.00 - 0.01 K/uL NEUTROPHILS 91 (H) 32 - 75 % LYMPHOCYTES 7 (L) 12 - 49 % MONOCYTES 1 (L) 5 - 13 % EOSINOPHILS 1 0 - 7 % BASOPHILS 0 0 - 1 % IMMATURE GRANULOCYTES 0 0.0 - 0.5 % ABS. NEUTROPHILS 14.2 (H) 1.8 - 8.0 K/UL  
 ABS. LYMPHOCYTES 1.1 0.8 - 3.5 K/UL  
 ABS. MONOCYTES 0.2 0.0 - 1.0 K/UL  
 ABS. EOSINOPHILS 0.2 0.0 - 0.4 K/UL  
 ABS. BASOPHILS 0.0 0.0 - 0.1 K/UL  
 ABS. IMM. GRANS. 0.0 0.00 - 0.04 K/UL  
 DF AUTOMATED    
 RBC COMMENTS NORMOCYTIC, NORMOCHROMIC METABOLIC PANEL, COMPREHENSIVE Collection Time: 11/18/18  4:27 PM  
Result Value Ref Range Sodium 140 136 - 145 mmol/L Potassium 4.4 3.5 - 5.1 mmol/L Chloride 104 97 - 108 mmol/L  
 CO2 30 21 - 32 mmol/L Anion gap 6 5 - 15 mmol/L Glucose 105 (H) 65 - 100 mg/dL BUN 12 6 - 20 MG/DL Creatinine 1.23 0.70 - 1.30 MG/DL  
 BUN/Creatinine ratio 10 (L) 12 - 20 GFR est AA >60 >60 ml/min/1.73m2 GFR est non-AA >60 >60 ml/min/1.73m2 Calcium 8.6 8.5 - 10.1 MG/DL Bilirubin, total 0.4 0.2 - 1.0 MG/DL  
 ALT (SGPT) 35 12 - 78 U/L  
 AST (SGOT) 24 15 - 37 U/L Alk. phosphatase 129 (H) 45 - 117 U/L Protein, total 7.7 6.4 - 8.2 g/dL Albumin 3.2 (L) 3.5 - 5.0 g/dL Globulin 4.5 (H) 2.0 - 4.0 g/dL A-G Ratio 0.7 (L) 1.1 - 2.2 TSH 3RD GENERATION Collection Time: 11/18/18  4:27 PM  
Result Value Ref Range TSH 1.43 0.36 - 3.74 uIU/mL INFLUENZA A & B AG (RAPID TEST) Collection Time: 11/18/18  8:00 PM  
Result Value Ref Range Influenza A Antigen NEGATIVE  NEG Influenza B Antigen NEGATIVE  NEG    
POC LACTIC ACID Collection Time: 11/18/18  9:42 PM  
Result Value Ref Range Lactic Acid (POC) 2.49 (HH) 0.40 - 2.00 mmol/L  
LACTIC ACID Collection Time: 11/18/18  9:57 PM  
Result Value Ref Range Lactic acid 2.2 (HH) 0.4 - 2.0 MMOL/L  
SAMPLES BEING HELD Collection Time: 11/18/18  9:59 PM  
Result Value Ref Range SAMPLES BEING HELD RED TOP   
 COMMENT Add-on orders for these samples will be processed based on acceptable specimen integrity and analyte stability, which may vary by analyte. EKG, 12 LEAD, INITIAL Collection Time: 11/18/18 10:22 PM  
Result Value Ref Range Ventricular Rate 88 BPM  
 Atrial Rate 88 BPM  
 P-R Interval 132 ms QRS Duration 132 ms Q-T Interval 362 ms QTC Calculation (Bezet) 438 ms Calculated P Axis 28 degrees Calculated R Axis -11 degrees Diagnosis Normal sinus rhythm Right bundle branch block No previous ECGs available

## 2018-11-19 NOTE — ED PROVIDER NOTES
EMERGENCY DEPARTMENT HISTORY AND PHYSICAL EXAM 
 
 
Date: 11/18/2018 Patient Name: Nicki Rice History of Presenting Illness Chief Complaint Patient presents with  Shaking  
  pt reports he was sleeping in recliner today and felt like he had chills, went to bathroom and began shaking \"all over\" History Provided By: Patient HPI: Nicki Rice, 60 y.o. male presents ambulatory to the ED with cc of gradual onset chills and tremors, onset 1500 today after using bathroom. Pt reports that tremors were \"uncontrolled\"  And lasted for ~30 to 45 minutes. Pt reports that he was at baseline prior to episode. Pt reports that he is currently fatigued by tremor episode. Pt reports during episode he was having diaphoresis and SOB. Pt reports last time he went to see PCP for a physical was several years ago. Pt reports abdominal surgery when he was 24 y.o., and skin CA removal ~1 year ago. Pt reports having dental procedure done on 11/17/2018. Pt denies fever, hx of blood clots, recent travel, IVDU. He is feeling better at the time of my evaluation There are no other complaints, changes, or physical findings at this time. PCP: Shahla Donato MD 
 
 
 
Past History Past Medical History: No past medical history on file. Past Surgical History: No past surgical history on file. Family History: No family history on file. Social History: 
Social History Tobacco Use  Smoking status: Not on file Substance Use Topics  Alcohol use: Not on file  Drug use: Not on file Allergies: 
No Known Allergies Review of Systems Review of Systems Constitutional: Positive for chills, diaphoresis and fatigue. Negative for fever. HENT: Negative for congestion, rhinorrhea and sore throat. Respiratory: Positive for shortness of breath. Negative for cough. Cardiovascular: Negative for chest pain. Gastrointestinal: Negative for abdominal pain, nausea and vomiting. Genitourinary: Negative for dysuria and urgency. Skin: Negative for rash. Neurological: Positive for tremors. Negative for dizziness, light-headedness and headaches. All other systems reviewed and are negative. Physical Exam  
Physical Exam  
Constitutional: He is oriented to person, place, and time. He appears well-developed and well-nourished. No distress. Obese HENT:  
Head: Normocephalic and atraumatic. Eyes: Conjunctivae and EOM are normal. Pupils are equal, round, and reactive to light. Neck: Normal range of motion. Cardiovascular: Regular rhythm and intact distal pulses. Tachycardia present. Pulmonary/Chest: Effort normal and breath sounds normal. No stridor. No respiratory distress. Abdominal: Soft. He exhibits no distension. There is no tenderness. Musculoskeletal: Normal range of motion. Neurological: He is alert and oriented to person, place, and time. Skin: Skin is warm and dry. Splinter hemorrhage on the left thumb nail Psychiatric: He has a normal mood and affect. Nursing note and vitals reviewed. Diagnostic Study Results Labs - Recent Results (from the past 12 hour(s)) URINALYSIS W/ REFLEX CULTURE Collection Time: 11/18/18  4:15 PM  
Result Value Ref Range Color YELLOW/STRAW Appearance CLOUDY (A) CLEAR Specific gravity 1.016 1.003 - 1.030    
 pH (UA) 5.0 5.0 - 8.0 Protein 30 (A) NEG mg/dL Glucose NEGATIVE  NEG mg/dL Ketone NEGATIVE  NEG mg/dL Bilirubin NEGATIVE  NEG Blood NEGATIVE  NEG Urobilinogen 0.2 0.2 - 1.0 EU/dL Nitrites NEGATIVE  NEG Leukocyte Esterase NEGATIVE  NEG    
 WBC 0-4 0 - 4 /hpf  
 RBC 0-5 0 - 5 /hpf Epithelial cells FEW FEW /lpf Bacteria NEGATIVE  NEG /hpf  
 UA:UC IF INDICATED CULTURE NOT INDICATED BY UA RESULT CNI Spermatozoa PRESENT    
CBC WITH AUTOMATED DIFF  Collection Time: 11/18/18  4:27 PM  
 Result Value Ref Range WBC 15.7 (H) 4.1 - 11.1 K/uL  
 RBC 5.18 4.10 - 5.70 M/uL  
 HGB 15.2 12.1 - 17.0 g/dL HCT 47.1 36.6 - 50.3 % MCV 90.9 80.0 - 99.0 FL  
 MCH 29.3 26.0 - 34.0 PG  
 MCHC 32.3 30.0 - 36.5 g/dL  
 RDW 14.5 11.5 - 14.5 % PLATELET 439 412 - 868 K/uL MPV 9.1 8.9 - 12.9 FL  
 NRBC 0.0 0  WBC ABSOLUTE NRBC 0.00 0.00 - 0.01 K/uL NEUTROPHILS 91 (H) 32 - 75 % LYMPHOCYTES 7 (L) 12 - 49 % MONOCYTES 1 (L) 5 - 13 % EOSINOPHILS 1 0 - 7 % BASOPHILS 0 0 - 1 % IMMATURE GRANULOCYTES 0 0.0 - 0.5 % ABS. NEUTROPHILS 14.2 (H) 1.8 - 8.0 K/UL  
 ABS. LYMPHOCYTES 1.1 0.8 - 3.5 K/UL  
 ABS. MONOCYTES 0.2 0.0 - 1.0 K/UL  
 ABS. EOSINOPHILS 0.2 0.0 - 0.4 K/UL  
 ABS. BASOPHILS 0.0 0.0 - 0.1 K/UL  
 ABS. IMM. GRANS. 0.0 0.00 - 0.04 K/UL  
 DF AUTOMATED    
 RBC COMMENTS NORMOCYTIC, NORMOCHROMIC METABOLIC PANEL, COMPREHENSIVE Collection Time: 11/18/18  4:27 PM  
Result Value Ref Range Sodium 140 136 - 145 mmol/L Potassium 4.4 3.5 - 5.1 mmol/L Chloride 104 97 - 108 mmol/L  
 CO2 30 21 - 32 mmol/L Anion gap 6 5 - 15 mmol/L Glucose 105 (H) 65 - 100 mg/dL BUN 12 6 - 20 MG/DL Creatinine 1.23 0.70 - 1.30 MG/DL  
 BUN/Creatinine ratio 10 (L) 12 - 20 GFR est AA >60 >60 ml/min/1.73m2 GFR est non-AA >60 >60 ml/min/1.73m2 Calcium 8.6 8.5 - 10.1 MG/DL Bilirubin, total 0.4 0.2 - 1.0 MG/DL  
 ALT (SGPT) 35 12 - 78 U/L  
 AST (SGOT) 24 15 - 37 U/L Alk. phosphatase 129 (H) 45 - 117 U/L Protein, total 7.7 6.4 - 8.2 g/dL Albumin 3.2 (L) 3.5 - 5.0 g/dL Globulin 4.5 (H) 2.0 - 4.0 g/dL A-G Ratio 0.7 (L) 1.1 - 2.2 INFLUENZA A & B AG (RAPID TEST) Collection Time: 11/18/18  8:00 PM  
Result Value Ref Range Influenza A Antigen NEGATIVE  NEG Influenza B Antigen NEGATIVE  NEG Radiologic Studies -  
XR CHEST PA LAT Final Result CT Results  (Last 48 hours) None CXR Results  (Last 48 hours) 11/18/18 2014  XR CHEST PA LAT Final result Impression:  IMPRESSION:  
   
Nonspecific opacity in the lateral aspect of the lower right lung may represent  
a small amount of pleural fluid or pleural thickening. There is no focal  
airspace opacity. Narrative:  EXAM:  XR CHEST PA LAT INDICATION:  Shaking chills. COMPARISON: 2/6/2007 TECHNIQUE: PA and lateral chest views FINDINGS: The cardiomediastinal contours are stable. The pulmonary vasculature  
is within normal limits. There is nonspecific opacity in the lateral aspect of the right lower lung. The  
lungs and pleural spaces are otherwise clear. There is no pneumothorax. The  
visualized bones and upper abdomen are age-appropriate. Medical Decision Making I am the first provider for this patient. I reviewed the vital signs, available nursing notes, past medical history, past surgical history, family history and social history. Vital Signs-Reviewed the patient's vital signs. Patient Vitals for the past 12 hrs: 
 Temp Pulse Resp BP SpO2  
11/18/18 2125  98   98 % 11/18/18 2115 99.3 °F (37.4 °C)      
11/18/18 1553 98.3 °F (36.8 °C) (!) 114 18 151/82 99 % Pulse Oximetry Analysis - 99% on RA Cardiac Monitor:  
Rate: 114 bpm 
Rhythm: Sinus Tachycardia 1553 Records Reviewed: Nursing Notes and Old Medical Records Provider Notes (Medical Decision Making):  
Given description of rigors and persistent tachycardia, I am concerned about infection. Will work up with CXR, basic labs, UA, CXR, blood ctx. Patient also does have what appears to be a splinter hemorrhage on one of his nails. Possible bacteremia related to recent dental procedure. ED Course:  
Initial assessment performed. The patients presenting problems have been discussed, and they are in agreement with the care plan formulated and outlined with them.   I have encouraged them to ask questions as they arise throughout their visit. Labs with a leukocytosis, elevated lactate. CXR with no PNA, UA with no infection. Given persistent tachycardia, elevated white count, will cover for gm + bacteremia and discuss with hospitalist. 
 
Consult Note: 
9:37 PM 
Philippe Carlisle MD spoke with Amari Ramirez MD, Specialty: Hospitalist 
Discussed pt's hx, disposition, and available diagnostic and imaging results. Reviewed care plans. Consultant agrees with plans as outlined. Amari Ramirez MD will admit pt. Critical Care Time: CRITICAL CARE NOTE : 
 
9:38 PM 
 
IMPENDING DETERIORATION -Cardiovascular ASSOCIATED RISK FACTORS - Hypotension and Shock MANAGEMENT- Bedside Assessment INTERPRETATION -  Xrays and ECG INTERVENTIONS - hemodynamic mngmt CASE REVIEW - Hospitalist 
TREATMENT RESPONSE -Stable PERFORMED BY - Self NOTES   : 
 
I have spent 30 minutes of critical care time involved in lab review, consultations with specialist, family decision- making, bedside attention and documentation. During this entire length of time I was immediately available to the patient . Philippe Carlisle MD 
 
 
 
Disposition: 
Admit Note: 
9:38 PM 
Pt is being admitted by Amari Ramirez MD. The results of their tests and reason(s) for their admission have been discussed with pt and/or available family. They convey agreement and understanding for the need to be admitted and for admission diagnosis. Diagnosis Clinical Impression: 1. Rigors Attestations: This note is prepared by Héctor Castillo. Olya, acting as Scribe for MD Philippe Aponte MD: The scribe's documentation has been prepared under my direction and personally reviewed by me in its entirety. I confirm that the note above accurately reflects all work, treatment, procedures, and medical decision making performed by me.

## 2018-11-19 NOTE — PROGRESS NOTES
Pharmacy Automatic Renal Dosing Protocol - Antimicrobials Indication for Antimicrobials: bacteremia Current Regimen of Each Antimicrobial: 
Vancomycin pharmacy to dose (Start Date ; Day # 1) Ceftriaxone 1 gm IV q24hr (Start Date ; Day # 1) Previous Antimicrobial Therapy: 
 
Goal Level: VANCOMYCIN TROUGH GOAL RANGE Vancomycin Trough: 15 - 20 mcg/mL Date Dose & Interval Measured (mcg/mL) Extrapolated (mcg/mL) Significant Cultures:  
 paired blood: pending Radiology / Imaging results: (X-ray, CT scan or MRI): echo pending (r/o endocarditis, recent dental w/u) Paralysis, amputations, malnutrition:  
 
Labs: 
Recent Labs 18 
1627 CREA 1.23 BUN 12 WBC 15.7* Temp (24hrs), Av.7 °F (37.1 °C), Min:98.3 °F (36.8 °C), Max:99.3 °F (37.4 °C) Creatinine Clearance (mL/min) or Dialysis: 58.3 ml/min Impression/Plan:  
· Continue with ceftriaxone 1 gm IV every 24 hr 
· Vancomycin loading dose of 2000 mg IV x 1 dose, followed by maintenance dose of 1750 mg IV q16hr for a predicted trough of ~16 mcg/ml · CMP ordered for  am 
· Antimicrobial stop date 7 days for ceftriaxone; to be determined for vancomycin Pharmacy will follow daily and adjust medications as appropriate for renal function and/or serum levels. Thank you, Marcia Oewns, CECILIAD

## 2018-11-19 NOTE — PHYSICIAN ADVISORY
Letter of Status Determination:   
Recommend Changing patient status from INPATIENT to OBSERVATION Pt Name:  Mirian Talley MR#  [id number] [facility]#   [id number] Room and [address]  @ [facility] Hospitalization date  11/18/2018  7:21 PM  
Current Attending Physician  Jw Coffey MD  
Principal diagnosis  FREDY Huerta is a 60 y.o.  male presents ambulatory to the ED with cc of gradual onset chills and tremors, x day while at home-.pt takes no meds. Otherwise with no sig past medical history. no history of drug use.non smoker. Non drinker.   
Pt reports that tremors were \"uncontrolled\"  And lasted for ~30 to 45 minutes.  Pt reports that he was at baseline prior to episode.  
 Pt reports that he is currently fatigued by tremor episode.  Pt reports during episode he was having diaphoresis and SOB. denies HA, or nausea. Pt reports having dental fillings done on 11/17/2018.  Pt denies fever, hx of blood clots, or recent travel. No c/p no No uti sx.   
Wbc TRENDING DOWN, no fevers, stable vitals [place] MCG criteria Does  NOT apply STATUS DETERMINATION  On the basis of review of available clinical data, documentation in the chart  It is our recommendation that the patient's status is changed from INPATIENT to OBSERVATION The final decision of the patient's hospitalization status depends on the attending physician's judgment Additional comments Insurance  Payor: Idalia Julian / Plan: Silver Hodgkin / Product Type: Trupti De La Cruz /   
 
  
 
 
Jerry Guido MD 
Cell: [phone] Physician Advisor Cell Insurance Information GENERIC COMMERCIAL/BSHSI GENERIC COMMERCIAL Phone: [phone] Subscriber: Say Fletcher Subscriber#: [id number]  Group#:  Precert#:

## 2018-11-19 NOTE — ROUTINE PROCESS
1 
Report received from offgoing RN, patient is awaiting room assignment at this time [facility] TRANSFER - OUT REPORT: 
 
Verbal report given to [address] RN on Sukhwinder Medina  being transferred to renal for routine progression of care Report consisted of patients Situation, Background, Assessment and  
Recommendations(SBAR). Information from the following report(s) SBAR, Kardex, Intake/Output, Recent Results and Procedure Verification was reviewed with the receiving nurse. Lines:  
Peripheral IV Anterior; Left Hand (Active) Site Assessment Clean, dry, & intact 11/19/2018  8:23 AM  
Phlebitis Assessment 0 11/19/2018  8:23 AM  
Infiltration Assessment 0 11/19/2018  8:23 AM  
Dressing Status Clean, dry, & intact 11/19/2018  8:23 AM  
Dressing Type Transparent;Tape 11/19/2018  8:23 AM  
Hub Color/Line Status Pink;Capped 11/19/2018  8:23 AM  
  
 
Opportunity for questions and clarification was provided. Patient transported with: 
Transport Staff via wheelchair

## 2018-11-20 LAB
DATE LAST DOSE: NORMAL
REPORTED DOSE,DOSE: NORMAL UNITS
REPORTED DOSE/TIME,TMG: 226
VANCOMYCIN TROUGH SERPL-MCNC: 7.9 UG/ML (ref 5–10)

## 2018-11-20 PROCEDURE — 99152 MOD SED SAME PHYS/QHP 5/>YRS: CPT

## 2018-11-20 PROCEDURE — 74011250636 HC RX REV CODE- 250/636: Performed by: INTERNAL MEDICINE

## 2018-11-20 PROCEDURE — 74011000258 HC RX REV CODE- 258: Performed by: INTERNAL MEDICINE

## 2018-11-20 PROCEDURE — 94760 N-INVAS EAR/PLS OXIMETRY 1: CPT

## 2018-11-20 PROCEDURE — 74011000250 HC RX REV CODE- 250: Performed by: INTERNAL MEDICINE

## 2018-11-20 PROCEDURE — B24BZZ4 ULTRASONOGRAPHY OF HEART WITH AORTA, TRANSESOPHAGEAL: ICD-10-PCS | Performed by: INTERNAL MEDICINE

## 2018-11-20 PROCEDURE — 74011250636 HC RX REV CODE- 250/636

## 2018-11-20 PROCEDURE — 99153 MOD SED SAME PHYS/QHP EA: CPT

## 2018-11-20 PROCEDURE — 93312 ECHO TRANSESOPHAGEAL: CPT

## 2018-11-20 PROCEDURE — 36415 COLL VENOUS BLD VENIPUNCTURE: CPT

## 2018-11-20 PROCEDURE — 65660000000 HC RM CCU STEPDOWN

## 2018-11-20 PROCEDURE — 80202 ASSAY OF VANCOMYCIN: CPT

## 2018-11-20 RX ORDER — MIDAZOLAM HYDROCHLORIDE 1 MG/ML
.5-1 INJECTION, SOLUTION INTRAMUSCULAR; INTRAVENOUS
Status: DISCONTINUED | OUTPATIENT
Start: 2018-11-20 | End: 2018-11-20 | Stop reason: HOSPADM

## 2018-11-20 RX ORDER — MIDAZOLAM HYDROCHLORIDE 1 MG/ML
INJECTION, SOLUTION INTRAMUSCULAR; INTRAVENOUS
Status: DISCONTINUED
Start: 2018-11-20 | End: 2018-11-20

## 2018-11-20 RX ORDER — LIDOCAINE HYDROCHLORIDE 20 MG/ML
SOLUTION OROPHARYNGEAL
Status: DISCONTINUED
Start: 2018-11-20 | End: 2018-11-20

## 2018-11-20 RX ORDER — LIDOCAINE HYDROCHLORIDE 20 MG/ML
20 SOLUTION OROPHARYNGEAL ONCE
Status: COMPLETED | OUTPATIENT
Start: 2018-11-20 | End: 2018-11-20

## 2018-11-20 RX ORDER — FENTANYL CITRATE 50 UG/ML
25-50 INJECTION, SOLUTION INTRAMUSCULAR; INTRAVENOUS
Status: DISCONTINUED | OUTPATIENT
Start: 2018-11-20 | End: 2018-11-20 | Stop reason: HOSPADM

## 2018-11-20 RX ORDER — FENTANYL CITRATE 50 UG/ML
INJECTION, SOLUTION INTRAMUSCULAR; INTRAVENOUS
Status: DISCONTINUED
Start: 2018-11-20 | End: 2018-11-20

## 2018-11-20 RX ADMIN — FENTANYL CITRATE 25 MCG: 50 INJECTION, SOLUTION INTRAMUSCULAR; INTRAVENOUS at 13:29

## 2018-11-20 RX ADMIN — MIDAZOLAM HYDROCHLORIDE 1 MG: 1 INJECTION, SOLUTION INTRAMUSCULAR; INTRAVENOUS at 13:28

## 2018-11-20 RX ADMIN — SODIUM CHLORIDE 126 ML/HR: 900 INJECTION, SOLUTION INTRAVENOUS at 02:28

## 2018-11-20 RX ADMIN — MIDAZOLAM HYDROCHLORIDE 1 MG: 1 INJECTION, SOLUTION INTRAMUSCULAR; INTRAVENOUS at 13:30

## 2018-11-20 RX ADMIN — ENOXAPARIN SODIUM 40 MG: 40 INJECTION, SOLUTION INTRAVENOUS; SUBCUTANEOUS at 23:08

## 2018-11-20 RX ADMIN — BENZOCAINE, BUTAMBEN, AND TETRACAINE HYDROCHLORIDE 1 SPRAY: .028; .004; .004 AEROSOL, SPRAY TOPICAL at 13:32

## 2018-11-20 RX ADMIN — SODIUM CHLORIDE 126 ML/HR: 900 INJECTION, SOLUTION INTRAVENOUS at 18:30

## 2018-11-20 RX ADMIN — CEFTRIAXONE 1 G: 1 INJECTION, POWDER, FOR SOLUTION INTRAMUSCULAR; INTRAVENOUS at 22:32

## 2018-11-20 RX ADMIN — BENZOCAINE, BUTAMBEN, AND TETRACAINE HYDROCHLORIDE 1 SPRAY: .028; .004; .004 AEROSOL, SPRAY TOPICAL at 13:28

## 2018-11-20 RX ADMIN — VANCOMYCIN HYDROCHLORIDE 1750 MG: 10 INJECTION, POWDER, LYOPHILIZED, FOR SOLUTION INTRAVENOUS at 18:40

## 2018-11-20 RX ADMIN — ENOXAPARIN SODIUM 40 MG: 40 INJECTION, SOLUTION INTRAVENOUS; SUBCUTANEOUS at 15:07

## 2018-11-20 RX ADMIN — BENZOCAINE, BUTAMBEN, AND TETRACAINE HYDROCHLORIDE 1 SPRAY: .028; .004; .004 AEROSOL, SPRAY TOPICAL at 13:29

## 2018-11-20 RX ADMIN — LIDOCAINE HYDROCHLORIDE 15 ML: 20 SOLUTION ORAL; TOPICAL at 13:22

## 2018-11-20 RX ADMIN — VANCOMYCIN HYDROCHLORIDE 1750 MG: 10 INJECTION, POWDER, LYOPHILIZED, FOR SOLUTION INTRAVENOUS at 02:26

## 2018-11-20 RX ADMIN — MIDAZOLAM HYDROCHLORIDE 1 MG: 1 INJECTION, SOLUTION INTRAMUSCULAR; INTRAVENOUS at 13:31

## 2018-11-20 NOTE — PROGRESS NOTES
Pt admitted with fever//PNA s/p dental procedure 11/17/18. Pt is a morbidly obese pt who lives with spouse in [facility], full code, PCP Dr Ivette Flores, independent with adl, no dme, retired , and is to have a LENKA. Will follow to assist with d/c planning as needed. Care Management Interventions PCP Verified by CM: Yes Transition of Care Consult (CM Consult): Discharge Planning MyChart Signup: No 
Discharge Durable Medical Equipment: No 
Health Maintenance Reviewed: Yes Physical Therapy Consult: No 
Occupational Therapy Consult: No 
Speech Therapy Consult: No 
Current Support Network: Lives with Spouse Confirm Follow Up Transport: Family Plan discussed with Pt/Family/Caregiver: Yes Freedom of Choice Offered: Yes Discharge Location Discharge Placement: Home

## 2018-11-20 NOTE — PROGRESS NOTES
Bedside shift change report given to Nelly Pro RN (oncoming nurse) by Kevin Sterling RN (offgoing nurse). Report included the following information SBAR, Kardex, Intake/Output, MAR and Recent Results. Upcoming nurse is notified that dual skin assessment has not been done.

## 2018-11-20 NOTE — PROGRESS NOTES
Problem: Falls - Risk of 
Goal: *Absence of Falls Document Genevive Camera Fall Risk and appropriate interventions in the flowsheet. Outcome: Progressing Towards Goal 
Fall Risk Interventions: 
  
 
  
 
Medication Interventions: Bed/chair exit alarm, Patient to call before getting OOB History of Falls Interventions: Bed/chair exit alarm, Door open when patient unattended, Room close to nurse's station

## 2018-11-20 NOTE — PROGRESS NOTES
Primary Nurse Catherine Schmitz, JOE and [facility], RN performed a dual skin assessment on this patient No impairment noted Osito score is 23

## 2018-11-20 NOTE — PROGRESS NOTES
LENKA 
Normal LV function, normal valve function. No signs of endocarditis by echocardiography. Please call if we can be of further assistance.

## 2018-11-20 NOTE — PROGRESS NOTES
Problem: Falls - Risk of 
Goal: *Absence of Falls Document Dolores Gandara Fall Risk and appropriate interventions in the flowsheet. Outcome: Progressing Towards Goal 
Fall Risk Interventions: 
  
 
  
 
Medication Interventions: Evaluate medications/consider consulting pharmacy, Patient to call before getting OOB

## 2018-11-20 NOTE — PROGRESS NOTES
Spiritual Care Partner Volunteer visited patient in [address] on November 20, 2016. Documented by: 
DIVINE Carcamo, [facility],  [facility]  Paging Service  [phone]

## 2018-11-20 NOTE — PROGRESS NOTES
Hospitalist Progress Note NAME: Elli Duncan :  1960 MRN:  [id number] This patient is at above high risk of deterioration based on documented presenting clinical data, comorbid conditions, high risk of adverse events and current acute care course. Assessment / Plan: 
Gram negative bacteremia SIRS, unclear etiology 
-recent dental work places him at risk for bacteremia and the presence of rigors, risk of IE 
- LENKA wnl  
-follow cultures positive for gram neg rods - FU speciation - UA cloudy but negative but will send for urine culture 
-up oob to moving 
-noted normal cta chest, cxr 
-ecg with RBBB 
- c/w ceftriaxone and vancomycin for now until final blood cultures - Fu daily CBC Code status: Full Prophylaxis: Lovenox Recommended Disposition: Home w/Family Medical Decision Making Today · Subjective: Chief Complaint / Reason for Physician Visit \"I am feeling well\". Discussed with RN events overnight. No further chills, rigors. No fevers/chills/n/v/d. Review of Systems: 
Symptom Y/N Comments  Symptom Y/N Comments Fever/Chills n   Chest Pain n   
Poor Appetite n   Edema Cough n   Abdominal Pain n   
Sputum    Joint Pain SOB/SOLORZANO n   Pruritis/Rash Nausea/vomit n   Tolerating PT/OT Diarrhea n   Tolerating Diet y Constipation    Other Could NOT obtain due to:   
 
Objective: VITALS:  
Last 24hrs VS reviewed since prior progress note. Most recent are: 
Patient Vitals for the past 24 hrs: 
 Temp Pulse Resp BP SpO2  
18 1502 98.5 °F (36.9 °C) 74 18 117/48 96 % 18 1345  93 21 112/62 96 % 18 1340  82 19 103/63 96 % 18 1337  85 21 108/70 98 % 18 1335  95 24 (!) 86/72 98 % 18 1330  89 22 120/53 96 % 18 1327  92 12 122/62 100 % 18 1320  79 18 115/64 100 % 18 1318  85 19 127/72 98 % 18 1120 98.3 °F (36.8 °C) 73 18 110/71 97 % 11/20/18 0820 98.1 °F (36.7 °C) 73 18 115/72 97 % 11/20/18 0258 98.3 °F (36.8 °C) 76 18 115/62 95 % 11/19/18 2245 99 °F (37.2 °C) 88 18 109/76 96 % 11/19/18 1930 99.8 °F (37.7 °C) 99 18 118/61 97 % Intake/Output Summary (Last 24 hours) at 11/20/2018 1750 Last data filed at 11/20/2018 0900 Gross per 24 hour Intake 794 ml Output  Net 794 ml PHYSICAL EXAM: 
General: WD, obese m Alert, cooperative, no acute distress   
EENT:  EOMI. Anicteric sclerae. MM dry Resp:  CTA bilaterally, no wheezing or rales. No accessory muscle use CV:  Regular  rhythm,  No edema GI:  Soft, Non distended, Non tender.  +Bowel sounds Neurologic:  Alert and oriented X 3, normal speech Psych:   Good insight. Not anxious nor agitated Skin:  no rashes or ulcers. No jaundice Reviewed most current lab test results and cultures  YES Reviewed most current radiology test results   YES Review and summation of old records today    NO Reviewed patient's current orders and MAR    YES 
PMH/SH reviewed - no change compared to H&P 
________________________________________________________________________ Care Plan discussed with: 
  Comments Patient x Discussed with patient in room. POC discussed. Questions answered (20 Family RN x 5 Care Manager Consultant:    
                  Review chart  
________________________________________________________________________ Total NON critical care TIME:  30   Minutes Total CRITICAL CARE TIME Spent:    
 
  Comments >50% of visit spent in counseling and coordination of care x See above  
________________________________________________________________________ Procedures: see electronic medical records for all procedures/Xrays and details which were not copied into this note but were reviewed prior to creation of Plan. LABS: 
Recent Labs 11/19/18 
0350 11/18/18 
1627 WBC 13.0* 15.7* HGB 14.4 15.2 HCT 44.1 47.1  234 Recent Labs 11/19/18 
0350 11/18/18 
1627  140  
K 4.2 4.4  
* 104 CO2 27 30 BUN 11 12 CREA 1.07 1.23  
* 105* CA 7.6* 8.6 Recent Labs 11/19/18 
0350 11/18/18 
1627 SGOT 25 24 ALT 31 35  129* TBILI 0.4 0.4 TP 7.0 7.7 ALB 2.8* 3.2*  
GLOB 4.2* 4.5* No results for input(s): INR, PTP, APTT in the last 72 hours. No lab exists for component: INREXT, INREXT No results for input(s): FE, TIBC, PSAT, FERR in the last 72 hours. No results found for: FOL, RBCF No results for input(s): PH, PCO2, PO2 in the last 72 hours. No results for input(s): PHI, PO2I, PCO2I in the last 72 hours. No results for input(s): CPK, CKNDX, TROIQ in the last 72 hours. No lab exists for component: CPKMB No results found for: CHOL, CHOLX, CHLST, CHOLV, HDL, LDL, LDLC, DLDLP, TGLX, TRIGL, TRIGP, CHHD, CHHDX No results found for: [facility] Lab Results Component Value Date/Time Color YELLOW/STRAW 11/18/2018 04:15 PM  
 Appearance CLOUDY (A) 11/18/2018 04:15 PM  
 Specific gravity 1.016 11/18/2018 04:15 PM  
 pH (UA) 5.0 11/18/2018 04:15 PM  
 Protein 30 (A) 11/18/2018 04:15 PM  
 Glucose NEGATIVE  11/18/2018 04:15 PM  
 Ketone NEGATIVE  11/18/2018 04:15 PM  
 Bilirubin NEGATIVE  11/18/2018 04:15 PM  
 Urobilinogen 0.2 11/18/2018 04:15 PM  
 Nitrites NEGATIVE  11/18/2018 04:15 PM  
 Leukocyte Esterase NEGATIVE  11/18/2018 04:15 PM  
 Epithelial cells FEW 11/18/2018 04:15 PM  
 Bacteria NEGATIVE  11/18/2018 04:15 PM  
 WBC 0-4 11/18/2018 04:15 PM  
 RBC 0-5 11/18/2018 04:15 PM  
 
 
RADIOGRAPHIC STUDIES: 
CXR Results  (Last 48 hours)  
          
 11/18/18 2014  XR CHEST PA LAT Final result Impression:  IMPRESSION:  
   
Nonspecific opacity in the lateral aspect of the lower right lung may represent  
a small amount of pleural fluid or pleural thickening. There is no focal  
airspace opacity. Narrative:  EXAM:  XR CHEST PA LAT INDICATION:  Shaking chills. COMPARISON: 2/6/2007 TECHNIQUE: PA and lateral chest views FINDINGS: The cardiomediastinal contours are stable. The pulmonary vasculature  
is within normal limits. There is nonspecific opacity in the lateral aspect of the right lower lung. The  
lungs and pleural spaces are otherwise clear. There is no pneumothorax. The  
visualized bones and upper abdomen are age-appropriate. CT Results  (Last 48 hours)  
          
 11/19/18 0037  CTA CHEST W OR W WO CONT Final result Impression:  IMPRESSION:   
1. No acute pulmonary embolism or other acute abnormality in the chest. A benign  
right pleural reflection account the peripheral density seen on chest  
radiograph. 2. Hepatic steatosis. Narrative:  EXAM:  CT angiography chest  
   
INDICATION:  Chills and shaking all over. COMPARISON: Chest radiographs 11/18/2018 TECHNIQUE: Helical thin section chest CT following uneventful intravenous  
administration of nonionic contrast according to departmental PE protocol. Coronal and sagittal reformats were performed. 3D/MIP post processing was  
performed. CT dose reduction was achieved through use of a standardized protocol  
tailored for this examination and automatic exposure control for dose  
modulation. FINDINGS: This is a good quality study for the evaluation of pulmonary embolism  
to the first subsegmental arterial level. There is no pulmonary embolism to this  
level. The visualized thyroid gland is unremarkable. The aorta and main pulmonary  
artery are normal in caliber. Cardiac size is within normal limits. No  
pericardial effusion. No lymphadenopathy by imaging size criteria. A benign right pleural reflection account for the peripheral density seen on  
chest radiograph. There is no suspicious lung nodule, mass, or consolidation. There is no pleural effusion or pneumothorax. The central airways are clear. The liver is diffusely low in attenuation. There is no acute abnormality in the  
visualized upper abdomen. There are degenerative changes in the spine. An  
hemangioma is noted in the T7 vertebral body. Echo Results  (Last 48 hours) None VENOUS DOPPLER results  (Last 48 hours) None CULTURES: 
 
No results found for: SDES Lab Results Component Value Date/Time Culture result: (A) 11/18/2018 09:48 PM  
  GRAM NEGATIVE RODS GROWING IN 2 OF 4 BOTTLES DRAWN (SITES=L HAND AND L AC) Culture result: REMAINING BOTTLE(S) HAS/HAVE NO GROWTH SO FAR 11/18/2018 09:48 PM  
  
 
 
Signed: Dave Stock MD 
 
This note will not be viewable in [address]

## 2018-11-20 NOTE — INTERDISCIPLINARY ROUNDS
Interdisciplinary team rounds were held 11/20/2018 with the following team members:Care Management, Nursing and Pharmacy and the patient. Plan of care discussed. See clinical pathway and/or care plan for interventions and desired outcomes. LENKA WNL, awaiting BC results.

## 2018-11-20 NOTE — PROGRESS NOTES
Pt arrived to room, Dr. Elidia Iraheta to do LENKA, gave 3mg of Versed, 25mcg of fentanyl with effect, LENKA completed with no complications, VSS, pt is awake and talking.

## 2018-11-21 ENCOUNTER — TELEPHONE (OUTPATIENT)
Dept: FAMILY MEDICINE CLINIC | Age: 58
End: 2018-11-21

## 2018-11-21 LAB
ANION GAP SERPL CALC-SCNC: 4 MMOL/L (ref 5–15)
BASOPHILS # BLD: 0 K/UL (ref 0–0.1)
BASOPHILS NFR BLD: 1 % (ref 0–1)
BUN SERPL-MCNC: 8 MG/DL (ref 6–20)
BUN/CREAT SERPL: 9 (ref 12–20)
CALCIUM SERPL-MCNC: 7.4 MG/DL (ref 8.5–10.1)
CHLORIDE SERPL-SCNC: 111 MMOL/L (ref 97–108)
CO2 SERPL-SCNC: 25 MMOL/L (ref 21–32)
CREAT SERPL-MCNC: 0.91 MG/DL (ref 0.7–1.3)
DIFFERENTIAL METHOD BLD: ABNORMAL
EOSINOPHIL # BLD: 0.3 K/UL (ref 0–0.4)
EOSINOPHIL NFR BLD: 3 % (ref 0–7)
ERYTHROCYTE [DISTWIDTH] IN BLOOD BY AUTOMATED COUNT: 14.7 % (ref 11.5–14.5)
GLUCOSE SERPL-MCNC: 120 MG/DL (ref 65–100)
HCT VFR BLD AUTO: 39.5 % (ref 36.6–50.3)
HGB BLD-MCNC: 13.1 G/DL (ref 12.1–17)
IMM GRANULOCYTES # BLD: 0 K/UL (ref 0–0.04)
IMM GRANULOCYTES NFR BLD AUTO: 0 % (ref 0–0.5)
LYMPHOCYTES # BLD: 1.6 K/UL (ref 0.8–3.5)
LYMPHOCYTES NFR BLD: 20 % (ref 12–49)
MCH RBC QN AUTO: 29.7 PG (ref 26–34)
MCHC RBC AUTO-ENTMCNC: 33.2 G/DL (ref 30–36.5)
MCV RBC AUTO: 89.6 FL (ref 80–99)
MONOCYTES # BLD: 0.9 K/UL (ref 0–1)
MONOCYTES NFR BLD: 11 % (ref 5–13)
NEUTS SEG # BLD: 5.5 K/UL (ref 1.8–8)
NEUTS SEG NFR BLD: 65 % (ref 32–75)
NRBC # BLD: 0 K/UL (ref 0–0.01)
NRBC BLD-RTO: 0 PER 100 WBC
PLATELET # BLD AUTO: 202 K/UL (ref 150–400)
PMV BLD AUTO: 9.3 FL (ref 8.9–12.9)
POTASSIUM SERPL-SCNC: 3.6 MMOL/L (ref 3.5–5.1)
RBC # BLD AUTO: 4.41 M/UL (ref 4.1–5.7)
SODIUM SERPL-SCNC: 140 MMOL/L (ref 136–145)
WBC # BLD AUTO: 8.3 K/UL (ref 4.1–11.1)

## 2018-11-21 PROCEDURE — 87040 BLOOD CULTURE FOR BACTERIA: CPT

## 2018-11-21 PROCEDURE — 74011250636 HC RX REV CODE- 250/636: Performed by: INTERNAL MEDICINE

## 2018-11-21 PROCEDURE — 77010033678 HC OXYGEN DAILY

## 2018-11-21 PROCEDURE — 65660000000 HC RM CCU STEPDOWN

## 2018-11-21 PROCEDURE — 85025 COMPLETE CBC W/AUTO DIFF WBC: CPT

## 2018-11-21 PROCEDURE — 36415 COLL VENOUS BLD VENIPUNCTURE: CPT

## 2018-11-21 PROCEDURE — 87086 URINE CULTURE/COLONY COUNT: CPT

## 2018-11-21 PROCEDURE — 94760 N-INVAS EAR/PLS OXIMETRY 1: CPT

## 2018-11-21 PROCEDURE — 74011000258 HC RX REV CODE- 258: Performed by: INTERNAL MEDICINE

## 2018-11-21 PROCEDURE — 80048 BASIC METABOLIC PNL TOTAL CA: CPT

## 2018-11-21 RX ORDER — VANCOMYCIN HYDROCHLORIDE
1250 EVERY 8 HOURS
Status: DISCONTINUED | OUTPATIENT
Start: 2018-11-21 | End: 2018-11-21

## 2018-11-21 RX ADMIN — SODIUM CHLORIDE 126 ML/HR: 900 INJECTION, SOLUTION INTRAVENOUS at 15:59

## 2018-11-21 RX ADMIN — CEFTRIAXONE SODIUM 2 G: 2 INJECTION, POWDER, FOR SOLUTION INTRAMUSCULAR; INTRAVENOUS at 13:31

## 2018-11-21 RX ADMIN — ENOXAPARIN SODIUM 40 MG: 40 INJECTION, SOLUTION INTRAVENOUS; SUBCUTANEOUS at 13:31

## 2018-11-21 RX ADMIN — SODIUM CHLORIDE 126 ML/HR: 900 INJECTION, SOLUTION INTRAVENOUS at 23:28

## 2018-11-21 RX ADMIN — VANCOMYCIN HYDROCHLORIDE 1250 MG: 10 INJECTION, POWDER, LYOPHILIZED, FOR SOLUTION INTRAVENOUS at 09:42

## 2018-11-21 RX ADMIN — ENOXAPARIN SODIUM 40 MG: 40 INJECTION, SOLUTION INTRAVENOUS; SUBCUTANEOUS at 23:28

## 2018-11-21 NOTE — PROGRESS NOTES
Bedside and Verbal shift change report given to Ryan Arzate (oncoming nurse) by Iris Rock (offgoing nurse). Report included the following information SBAR, Kardex, Intake/Output, MAR and Recent Results.

## 2018-11-21 NOTE — CONSULTS
Infectious Disease Consult Note    Reason for Consult: Ecoli bacteremia   Date of Consultation: November 21, 2018  Date of Admission: 11/18/2018  Referring Physician: Dhara Lomas MD       HPI:      Mr Arletha Moritz is a 60year old gentleman wt obesity, recent dental work ( last filling 10/12/18 with extractions in august 2018), who had shaking chills prompting him to come to ER. Had been taking prn Aleve due to dental work and also felt he pulled a muscle about 2-3 weeks ago and hit his head on a dresser. Apart from chills,denied fevers,nasuea,vomiting,diarhrea,dysuria, bone/joint pain. He says he has been at his baseline state of health other than the recent dental work. Says chills resolved as well   Was started on Vancomycin and Ceftriaxone   Had LENKA done today  He denied any travel hx ,hx of bacteremia, indwelling prosthesis  Works as a teacher   Only thing unusual he recalls is that he opened/peeled a lot of banana's recently and some were rotten. He denied eating them. Deneid preceding GI illness or sick contacts. Has septic tank and well water at home       Past Medical History:  Dental fillings and extractions since 8/2018 ,last fillin 10/12/18     Surgical History:  No past surgical history on file. Family History:   No family history on file.       Social History:     · Living Situation: at home with wife,teacher   · Tobacco:denied  · Alcohol:denied  · Illicit Drugs:denied   · Sexual History: occassionally with wife   · Travel History :  denied   · Exposures:   · Outdoor/Hiking: denied  · Animal/Pet: Dogs at home but no scratches or bites   · Construction: denied  · Hot Tub: denied   · Brackish/stagnant exposure: denied  · TB exposure: denied  · Sick Contacts: denied     Allergies:  No Known Allergies      Review of Systems:     Gen: Negative for fevers, weight loss, weight gain ,+shaking chills    HEENT: Negative for headache, vision changes, ear ache or discharge, tingling,  nasal discharge,  swelling, lumps in neck, sores on tongue   CV:  Negative for chest pain, dyspnea on exertion, leg edema   Lungs: Negative for shortness of breath, cough, wheezing   Abdomen: Negative for abdominal pain, nausea, vomiting, diarrhea, constipation   Genitourinary: Negative for genital pain or genital discharge     Neuro: Negative for headache, numbness, tingling, extremity weakness,  syncope, seizures    Skin: Negative for rash, sores/open wounds   Musculoskeletal: Negative for joint pain, joint swelling, joint erythema    Endocrine: Negative for high or low blood sugars, heat or cold intolerance    Psych: Negative for manic behavior     Medications:  No current facility-administered medications on file prior to encounter. Current Outpatient Medications on File Prior to Encounter   Medication Sig Dispense Refill    ibuprofen 200 mg cap Take  by mouth.              Physical Exam:    Vitals:   Patient Vitals for the past 24 hrs:   Temp Pulse Resp BP SpO2   11/21/18 1050 98.2 °F (36.8 °C) 76 22 110/64 97 %   11/21/18 0732 98.3 °F (36.8 °C) 76 18 120/75 95 %   11/21/18 0326 98.8 °F (37.1 °C) 76 16 110/60 97 %   11/20/18 2221 98.7 °F (37.1 °C) 84 16 123/58 98 %   11/20/18 1929 98.3 °F (36.8 °C) 74 16 123/50 96 %   11/20/18 1502 98.5 °F (36.9 °C) 74 18 117/48 96 %   11/20/18 1345  93 21 112/62 96 %   11/20/18 1340  82 19 103/63 96 %   11/20/18 1337  85 21 108/70 98 %   11/20/18 1335  95 24 (!) 86/72 98 %   11/20/18 1330  89 22 120/53 96 %   11/20/18 1327  92 12 122/62 100 %   11/20/18 1320  79 18 115/64 100 %   11/20/18 1318  85 19 127/72 98 %   ·   · GEN: NAD  · HEENT: + laceration small on Scalp, no erythema around site,  no scleral icterus,  no thrush,+fillings,missing some teeth   · CV: S1, S2 heard regularly distant with body habitus   · Lungs: Clear to auscultation bilaterally but limited exam with body habitus   · Abdomen: soft, obese, non tender,  no CVA tenderness   · Genitourinary: no martinez  · Extremities: no edema  · Neuro: Alert, oriented to time, place and situation, moves all extremities to commands, verbal   · Skin: no rash,+ scab noted RLE  · Psych: good affect, good eye contact, non tearful   · Lines: peripheral       Labs:   Recent Results (from the past 24 hour(s))   VANCOMYCIN, TROUGH    Collection Time: 11/20/18  6:28 PM   Result Value Ref Range    Vancomycin,trough 7.9 5.0 - 10.0 ug/mL    Reported dose date: 20181120      Reported dose time: 0226      Reported dose: 1750 MG UNITS   METABOLIC PANEL, BASIC    Collection Time: 11/21/18  1:53 AM   Result Value Ref Range    Sodium 140 136 - 145 mmol/L    Potassium 3.6 3.5 - 5.1 mmol/L    Chloride 111 (H) 97 - 108 mmol/L    CO2 25 21 - 32 mmol/L    Anion gap 4 (L) 5 - 15 mmol/L    Glucose 120 (H) 65 - 100 mg/dL    BUN 8 6 - 20 MG/DL    Creatinine 0.91 0.70 - 1.30 MG/DL    BUN/Creatinine ratio 9 (L) 12 - 20      GFR est AA >60 >60 ml/min/1.73m2    GFR est non-AA >60 >60 ml/min/1.73m2    Calcium 7.4 (L) 8.5 - 10.1 MG/DL   CBC WITH AUTOMATED DIFF    Collection Time: 11/21/18  1:53 AM   Result Value Ref Range    WBC 8.3 4.1 - 11.1 K/uL    RBC 4.41 4.10 - 5.70 M/uL    HGB 13.1 12.1 - 17.0 g/dL    HCT 39.5 36.6 - 50.3 %    MCV 89.6 80.0 - 99.0 FL    MCH 29.7 26.0 - 34.0 PG    MCHC 33.2 30.0 - 36.5 g/dL    RDW 14.7 (H) 11.5 - 14.5 %    PLATELET 905 963 - 963 K/uL    MPV 9.3 8.9 - 12.9 FL    NRBC 0.0 0  WBC    ABSOLUTE NRBC 0.00 0.00 - 0.01 K/uL    NEUTROPHILS 65 32 - 75 %    LYMPHOCYTES 20 12 - 49 %    MONOCYTES 11 5 - 13 %    EOSINOPHILS 3 0 - 7 %    BASOPHILS 1 0 - 1 %    IMMATURE GRANULOCYTES 0 0.0 - 0.5 %    ABS. NEUTROPHILS 5.5 1.8 - 8.0 K/UL    ABS. LYMPHOCYTES 1.6 0.8 - 3.5 K/UL    ABS. MONOCYTES 0.9 0.0 - 1.0 K/UL    ABS. EOSINOPHILS 0.3 0.0 - 0.4 K/UL    ABS. BASOPHILS 0.0 0.0 - 0.1 K/UL    ABS. IMM.  GRANS. 0.0 0.00 - 0.04 K/UL    DF AUTOMATED         Microbiology Data:       Blood:11/18/18  Special Requests: NO SPECIAL REQUESTS    Preliminary   Culture result: (Abnormal)    Preliminary   ESCHERICHIA COLI GROWING IN 2 OF 4 BOTTLES DRAWN (SITES = LAC AND L HAND) Abnormal     Culture result:    Preliminary   REMAINING BOTTLE(S) HAS/HAVE NO GROWTH SO FAR    Susceptibility      Escherichia coli     MAMADOU (Preliminary)    Amikacin ($) <=16 ug/mL S    Ampicillin ($) <=8 ug/mL S    Ampicillin/sulbactam ($) <=8/4 ug/mL S    Aztreonam ($$$$) <=4 ug/mL S    Cefazolin ($) <=8 ug/mL S    Cefepime ($$) <=4 ug/mL S    Cefotaxime <=2 ug/mL S    Ceftazidime ($) <=1 ug/mL S    Ceftriaxone ($) <=1 ug/mL S    Cefuroxime ($) <=4 ug/mL S    Ciprofloxacin ($) <=1 ug/mL S    Gentamicin ($) <=4 ug/mL S    Imipenem <=1 ug/mL S    Levofloxacin ($) <=2 ug/mL S    Meropenem ($$) <=1 ug/mL S    Piperacillin/Tazobac ($) <=16 ug/mL S    Tobramycin ($) <=4 ug/mL S    Trimeth/Sulfa <=2/38 ug/mL S          Blood 11/21/18 pending        Imaging:   CTA chest   FINDINGS: This is a good quality study for the evaluation of pulmonary embolism  to the first subsegmental arterial level. There is no pulmonary embolism to this  level.      The visualized thyroid gland is unremarkable. The aorta and main pulmonary  artery are normal in caliber. Cardiac size is within normal limits. No  pericardial effusion. No lymphadenopathy by imaging size criteria.     A benign right pleural reflection account for the peripheral density seen on  chest radiograph. There is no suspicious lung nodule, mass, or consolidation. There is no pleural effusion or pneumothorax. The central airways are clear.     The liver is diffusely low in attenuation. There is no acute abnormality in the  visualized upper abdomen. There are degenerative changes in the spine. An  hemangioma is noted in the T7 vertebral body.     IMPRESSION  IMPRESSION:   1. No acute pulmonary embolism or other acute abnormality in the chest. A benign  right pleural reflection account the peripheral density seen on chest  radiograph.     2.  Hepatic steatosis. LENKA 11/18/18  SUMMARY:  Procedure information: Sedation: Versed 3mg, fentanyl 25mcg. 15 minutes. Left ventricle: Systolic function was normal. Ejection fraction was  estimated in the range of 55 % to 60 %. There were no regional wall motion  abnormalities. Atrial septum: Negative bubble study. Mitral valve: No obvious mass, vegetation or thrombus noted. Aortic valve: No obvious mass, vegetation or thrombus noted. Aorta, systemic arteries: Visualized portions of aorta show mild  atherosclerosis.     Assessment / Plan:       Mr Marilynne Heimlich is a 60year old obese gentleman wt recent hx of dental work here with Ecoli bacteremia  Denied any GI/ illness   + hitting head on dresser few weeks ago but would be unusual pathogen to come small scalp injury  Has well water/septic tank at home     1) [place] [facility] is not entirely clear   Antibiotics adjusted to Ceftriaxone IV   Check repeat blood cx   Noted LENKA done and negative   HIV and Hep C screening ( consent obtained )   If recurrent GNR bacteremia,would be concerned about super infection with Strongyloids but this is his first episode and denied any GI symptoms/travel out of the country   Will continue to follow  Side effects of antibiotics including GI,renal,hematological,risk for CDI explained   Colonoscopy recommended later on as he says he has never had one ( age >47)     2) R lung  lesion on CXR/CT reported as benign pleural reflection     3) DVT px on enoxiparin     Patricia Parham DO  1:12 PM

## 2018-11-21 NOTE — PROGRESS NOTES
Bedside and Verbal shift change report given to Brittany Lamas RN and nursing student Swapnil Vega (oncoming nurse) by Tiffanie Ha RN (offgoing nurse). Report included the following information SBAR, Kardex, MAR and Recent Results.

## 2018-11-21 NOTE — TELEPHONE ENCOUNTER
Salma bello from [facility]    RE: HIV panel, needs consent signed by patient     Best number to reach her is [phone]

## 2018-11-21 NOTE — PROGRESS NOTES
Vancomycin trough = 7.9; Extrapolated trough = 7.92 mcg/ml. Will adjust vancomycin regimen to 1250 mg IV Q8H for an estimated trough of 16.51 mcg/ml

## 2018-11-21 NOTE — INTERDISCIPLINARY ROUNDS
Interdisciplinary team rounds were held 11/21/2018 with the following team members:Care Management, Nursing, Pharmacy and Physician and the patient. Plan of care discussed. See clinical pathway and/or care plan for interventions and desired outcomes. ID consult today, BC & UC sent.

## 2018-11-21 NOTE — PROGRESS NOTES
Hospitalist Progress Note NAME: Sukhwinder Medina :  1960 MRN:  [id number] This patient is at above high risk of deterioration based on documented presenting clinical data, comorbid conditions, high risk of adverse events and current acute care course. Assessment / Plan: 
Ecoli  bacteremia Sepsis (POA) due to ecoli in setting of recent dental work 
-recent dental work places him at risk for bacteremia and the presence of rigors, risk of IE 
- LENKA wnl - BC + ecoli sensitive to ceftriaxone, unclear etiology ( see ID note ) 
- repeat blood cultures  
- if negative , will need PICC and 2 weeks of IV ceftriaxone - UA cloudy but negative , will send for urine culture-up oob to moving 
-noted normal cta chest, cxr 
-ecg with RBBB -  Fu daily CBC Code status: Full Prophylaxis: Lovenox Recommended Disposition: Home w/Family Medical Decision Making Today · Subjective: Chief Complaint / Reason for Physician Visit FU bacterimia /sepsis. Discussed with RN events overnight. No further chills, rigors. No fevers/chills/n/v/d. Review of Systems: 
Symptom Y/N Comments  Symptom Y/N Comments Fever/Chills n   Chest Pain n   
Poor Appetite n   Edema Cough n   Abdominal Pain n   
Sputum    Joint Pain SOB/SOLORZANO n   Pruritis/Rash Nausea/vomit n   Tolerating PT/OT Diarrhea n   Tolerating Diet y Constipation    Other Could NOT obtain due to:   
 
Objective: VITALS:  
Last 24hrs VS reviewed since prior progress note. Most recent are: 
Patient Vitals for the past 24 hrs: 
 Temp Pulse Resp BP SpO2  
18 1508 98 °F (36.7 °C) 84 22 139/60 95 % 18 1050 98.2 °F (36.8 °C) 76 22 110/64 97 % 18 0732 98.3 °F (36.8 °C) 76 18 120/75 95 % 18 0326 98.8 °F (37.1 °C) 76 16 110/60 97 % 18 2221 98.7 °F (37.1 °C) 84 16 123/58 98 % 18 1929 98.3 °F (36.8 °C) 74 16 123/50 96 % Intake/Output Summary (Last 24 hours) at 2018 1626 Last data filed at 11/21/2018 1331 Gross per 24 hour Intake 540 ml Output  Net 540 ml PHYSICAL EXAM: 
General: WD, obese m Alert, cooperative, no acute distress   
EENT:  EOMI. Anicteric sclerae. MM dry Resp:  CTA bilaterally, no wheezing or rales. No accessory muscle use CV:  Regular  rhythm,  No edema GI:  Soft, Non distended, Non tender.  +Bowel sounds Neurologic:  Alert and oriented X 3, normal speech Psych:   Good insight. Not anxious nor agitated Skin:  no rashes or ulcers. No jaundice Reviewed most current lab test results and cultures  YES Reviewed most current radiology test results   YES Review and summation of old records today    NO Reviewed patient's current orders and MAR    YES 
PMH/ reviewed - no change compared to H&P 
________________________________________________________________________ Care Plan discussed with: 
  Comments Patient x Discussed with patient in room. POC discussed. Questions answered 15 Family RN x 5 Care Manager Consultant: x ID Review chart  
________________________________________________________________________ Total NON critical care TIME: 20   Minutes Total CRITICAL CARE TIME Spent:    
 
  Comments >50% of visit spent in counseling and coordination of care x See above  
________________________________________________________________________ Procedures: see electronic medical records for all procedures/Xrays and details which were not copied into this note but were reviewed prior to creation of Plan. LABS: 
Recent Labs  
  11/21/18 
0153 11/19/18 
0350 11/18/18 
1627 WBC 8.3 13.0* 15.7* HGB 13.1 14.4 15.2 HCT 39.5 44.1 47.1  204 234 Recent Labs  
  11/21/18 
0153 11/19/18 
0350 11/18/18 
1627  139 140  
K 3.6 4.2 4.4  
* 109* 104 CO2 25 27 30 BUN 8 11 12 CREA 0.91 1.07 1.23  
* 102* 105* CA 7.4* 7.6* 8.6 Recent Labs 11/19/18 9609 11/18/18 
1627 SGOT 25 24 ALT 31 35  129* TBILI 0.4 0.4 TP 7.0 7.7 ALB 2.8* 3.2*  
GLOB 4.2* 4.5* No results for input(s): INR, PTP, APTT in the last 72 hours. No lab exists for component: INREXT, INREXT No results for input(s): FE, TIBC, PSAT, FERR in the last 72 hours. No results found for: FOL, RBCF No results for input(s): PH, PCO2, PO2 in the last 72 hours. No results for input(s): PHI, PO2I, PCO2I in the last 72 hours. No results for input(s): CPK, CKNDX, TROIQ in the last 72 hours. No lab exists for component: CPKMB No results found for: CHOL, CHOLX, CHLST, CHOLV, HDL, LDL, LDLC, DLDLP, TGLX, TRIGL, TRIGP, CHHD, CHHDX No results found for: Calleen Shade Lab Results Component Value Date/Time Color YELLOW/STRAW 11/18/2018 04:15 PM  
 Appearance CLOUDY (A) 11/18/2018 04:15 PM  
 Specific gravity 1.016 11/18/2018 04:15 PM  
 pH (UA) 5.0 11/18/2018 04:15 PM  
 Protein 30 (A) 11/18/2018 04:15 PM  
 Glucose NEGATIVE  11/18/2018 04:15 PM  
 Ketone NEGATIVE  11/18/2018 04:15 PM  
 Bilirubin NEGATIVE  11/18/2018 04:15 PM  
 Urobilinogen 0.2 11/18/2018 04:15 PM  
 Nitrites NEGATIVE  11/18/2018 04:15 PM  
 Leukocyte Esterase NEGATIVE  11/18/2018 04:15 PM  
 Epithelial cells FEW 11/18/2018 04:15 PM  
 Bacteria NEGATIVE  11/18/2018 04:15 PM  
 WBC 0-4 11/18/2018 04:15 PM  
 RBC 0-5 11/18/2018 04:15 PM  
 
 
RADIOGRAPHIC STUDIES: 
CXR Results  (Last 48 hours) None CT Results  (Last 48 hours) None Echo Results  (Last 48 hours) None VENOUS DOPPLER results  (Last 48 hours) None CULTURES: 
 
No results found for: SDES Lab Results Component Value Date/Time Culture result: (A) 11/18/2018 09:48 PM  
  ESCHERICHIA COLI GROWING IN 2 OF 4 BOTTLES DRAWN (SITES = LAC AND L HAND)  Culture result: REMAINING BOTTLE(S) HAS/HAVE NO GROWTH SO FAR 11/18/2018 09:48 PM  
  
 
 
Signed: Kody Phillip MD 
 
 This note will not be viewable in [address]

## 2018-11-21 NOTE — CDMP QUERY
Please clarify if this patient is (was) being treated/managed for:  
 
=> Sepsis (POA) due to ecoli in the setting of recent dental work treated with NS 2L in ED; ID consult; blood cx; IV vanc and rocephin 
=> Other explanation of clinical findings 
=> Clinically Undetermined (no explanation for clinical findings) The medical record reflects the following clinical findings, treatment, and risk factors. Risk Factors:  recent dental work; SIRS Clinical Indicators:  VS T- 99.5/99.8  P- 94/99  WBC 15.2  blood cx postive e.coli LA 2.2 H&P IMP:  
Chills:with sirs 11/21 ID 
1) Serenity Castellanos is not entirely clear Antibiotics adjusted to Ceftriaxone IV  
11/20 INT MED:  
Gram negative bacteremia SIRS, unclear etiology 
-recent dental work places him at risk for bacteremia and the presence of rigors, risk of IE Treatment: NS 2L in ED; ID consult; blood cx; IV vanc and rocephin Please clarify and document your clinical opinion in the progress notes and discharge summary including the definitive and/or presumptive diagnosis, (suspected or probable), related to the above clinical findings. Please include clinical findings supporting your diagnosis. Thank You 
Sushila Birdt FORREST [email or web address] 
[phone]

## 2018-11-22 LAB
ANION GAP SERPL CALC-SCNC: 5 MMOL/L (ref 5–15)
BACTERIA SPEC CULT: NORMAL
BUN SERPL-MCNC: 9 MG/DL (ref 6–20)
BUN/CREAT SERPL: 10 (ref 12–20)
CALCIUM SERPL-MCNC: 7.8 MG/DL (ref 8.5–10.1)
CC UR VC: NORMAL
CHLORIDE SERPL-SCNC: 112 MMOL/L (ref 97–108)
CO2 SERPL-SCNC: 25 MMOL/L (ref 21–32)
CREAT SERPL-MCNC: 0.87 MG/DL (ref 0.7–1.3)
GLUCOSE SERPL-MCNC: 92 MG/DL (ref 65–100)
HCV AB SERPL QL IA: NONREACTIVE
HCV COMMENT,HCGAC: NORMAL
HIV 1+2 AB+HIV1 P24 AG SERPL QL IA: NONREACTIVE
HIV12 RESULT COMMENT, HHIVC: NORMAL
POTASSIUM SERPL-SCNC: 4.1 MMOL/L (ref 3.5–5.1)
SERVICE CMNT-IMP: NORMAL
SODIUM SERPL-SCNC: 142 MMOL/L (ref 136–145)

## 2018-11-22 PROCEDURE — 65660000000 HC RM CCU STEPDOWN

## 2018-11-22 PROCEDURE — 86803 HEPATITIS C AB TEST: CPT

## 2018-11-22 PROCEDURE — 74011000258 HC RX REV CODE- 258: Performed by: INTERNAL MEDICINE

## 2018-11-22 PROCEDURE — 87389 HIV-1 AG W/HIV-1&-2 AB AG IA: CPT

## 2018-11-22 PROCEDURE — 36415 COLL VENOUS BLD VENIPUNCTURE: CPT

## 2018-11-22 PROCEDURE — 80048 BASIC METABOLIC PNL TOTAL CA: CPT

## 2018-11-22 PROCEDURE — 74011250636 HC RX REV CODE- 250/636: Performed by: INTERNAL MEDICINE

## 2018-11-22 RX ADMIN — CEFTRIAXONE SODIUM 2 G: 2 INJECTION, POWDER, FOR SOLUTION INTRAMUSCULAR; INTRAVENOUS at 12:43

## 2018-11-22 RX ADMIN — ENOXAPARIN SODIUM 40 MG: 40 INJECTION, SOLUTION INTRAVENOUS; SUBCUTANEOUS at 11:49

## 2018-11-22 NOTE — PROGRESS NOTES
Infectious Disease Progress Note Subjective: 
Mr Brittany Salmon seen Doing well Tolerating antibiotics well Denied fever, chills abd pain, nausea, vomiting, diarrhea, dysuria, any particular joint ache  Pain Meds Current Facility-Administered Medications:  
  cefTRIAXone (ROCEPHIN) 2 g in 0.9% sodium chloride (MBP/ADV) 50 mL, 2 g, IntraVENous, Q24H, Patricia Parham DO, Last Rate: 100 mL/hr at 11/22/18 1243, 2 g at 11/22/18 1243   sodium chloride (NS) flush 5-10 mL, 5-10 mL, IntraVENous, PRN, Verónica Kaur MD 
  0.9% sodium chloride infusion, 126 mL/hr, IntraVENous, CONTINUOUS, Annetta Henriquez MD, Last Rate: 126 mL/hr at 11/21/18 2328, 126 mL/hr at 11/21/18 2328   acetaminophen (TYLENOL) tablet 650 mg, 650 mg, Oral, Q4H PRN, Ирина Marcos MD 
  albuterol (PROVENTIL VENTOLIN) nebulizer solution 1.25 mg, 1.25 mg, Nebulization, Q4H PRN, Ирина Marcos MD 
  enoxaparin (LOVENOX) injection 40 mg, 40 mg, SubCUTAneous, Q12H, Abril GARCIA MD, 40 mg at 11/22/18 1149 
  ondansetron (ZOFRAN) injection 4 mg, 4 mg, IntraVENous, Q4H PRN, Ирина Marcos MD 
 
 
 
Physical Exam: 
 
Vitals:  
Patient Vitals for the past 24 hrs: 
 Temp Pulse Resp BP SpO2  
11/22/18 1118 97.8 °F (36.6 °C) 78 14  98 % 11/22/18 0722 98 °F (36.7 °C) 97 18 127/75 96 % 11/22/18 0349 97.8 °F (36.6 °C) 70 20 106/60 98 % 11/21/18 2308 98 °F (36.7 °C) 69 20 117/77 97 % 11/21/18 1926 98.1 °F (36.7 °C) 80 18 130/56 95 % 11/21/18 1508 98 °F (36.7 °C) 84 22 139/60 95 % · GEN: NAD 
· HEENT: + laceration small on Scalp, no erythema around site,  no scleral icterus,  no thrush,+fillings,missing some teeth · CV: S1, S2 heard regularly · Lungs: Clear to auscultation bilaterally · Abdomen: soft, obese, non tender,  no CVA tenderness · Genitourinary: no martinez · Extremities: no edema · Neuro: Alert, oriented to time, place and situation, moves all extremities to commands, verbal  
 · Skin: no rash,+ scab noted RLE 
· Psych: good affect, good eye contact, non tearful · Lines: peripheral  
 
 
Labs:  
Recent Results (from the past 24 hour(s)) METABOLIC PANEL, BASIC Collection Time: 11/22/18  4:03 AM  
Result Value Ref Range Sodium 142 136 - 145 mmol/L Potassium 4.1 3.5 - 5.1 mmol/L Chloride 112 (H) 97 - 108 mmol/L  
 CO2 25 21 - 32 mmol/L Anion gap 5 5 - 15 mmol/L Glucose 92 65 - 100 mg/dL BUN 9 6 - 20 MG/DL Creatinine 0.87 0.70 - 1.30 MG/DL  
 BUN/Creatinine ratio 10 (L) 12 - 20 GFR est AA >60 >60 ml/min/1.73m2 GFR est non-AA >60 >60 ml/min/1.73m2 Calcium 7.8 (L) 8.5 - 10.1 MG/DL  
HIV 1/2 AG/AB, 4TH GENERATION,W RFLX CONFIRM Collection Time: 11/22/18  4:03 AM  
Result Value Ref Range HIV 1/2 Interpretation NONREACTIVE NR    
 HIV 1/2 result comment SEE NOTE    
HEPATITIS C AB Collection Time: 11/22/18  4:03 AM  
Result Value Ref Range Hep C  virus Ab Interp. NONREACTIVE NR Hep C  virus Ab comment Method used is [facility] Microbiology Data: 
  
  Blood:11/18/18 Special Requests: NO SPECIAL REQUESTS    Preliminary Culture result: (Abnormal)    Preliminary ESCHERICHIA COLI GROWING IN 2 OF 4 BOTTLES DRAWN (SITES = LAC AND L HAND) Abnormal    
Culture result:    Preliminary REMAINING BOTTLE(S) HAS/HAVE NO GROWTH SO FAR Susceptibility Escherichia coli MAMADOU (Preliminary) Amikacin ($) <=16 ug/mL S Ampicillin ($) <=8 ug/mL S Ampicillin/sulbactam ($) <=8/4 ug/mL S Aztreonam ($$$$) <=4 ug/mL S Cefazolin ($) <=8 ug/mL S Cefepime ($$) <=4 ug/mL S Cefotaxime <=2 ug/mL S Ceftazidime ($) <=1 ug/mL S Ceftriaxone ($) <=1 ug/mL S Cefuroxime ($) <=4 ug/mL S Ciprofloxacin ($) <=1 ug/mL S Gentamicin ($) <=4 ug/mL S Imipenem <=1 ug/mL S Levofloxacin ($) <=2 ug/mL S Meropenem ($$) <=1 ug/mL S Piperacillin/Tazobac ($) <=16 ug/mL S Tobramycin ($) <=4 ug/mL S   
 Trimeth/Sulfa <=2/38 ug/mL S Blood 11/21/18 pending Component Value Ref Range & Units Status Special Requests: NO SPECIAL REQUESTS    Preliminary Culture result: NO GROWTH AFTER 19 HOURS    Preliminary Result History CULTURE, BLOOD, PAIRED on 11/22/2018 Imaging: CTA chest  
FINDINGS: This is a good quality study for the evaluation of pulmonary embolism 
to the first subsegmental arterial level. There is no pulmonary embolism to this 
level.  
  
The visualized thyroid gland is unremarkable. The aorta and main pulmonary 
artery are normal in caliber. Cardiac size is within normal limits. No 
pericardial effusion. No lymphadenopathy by imaging size criteria. 
  
A benign right pleural reflection account for the peripheral density seen on 
chest radiograph. There is no suspicious lung nodule, mass, or consolidation. There is no pleural effusion or pneumothorax. The central airways are clear. 
  
The liver is diffusely low in attenuation. There is no acute abnormality in the 
visualized upper abdomen. There are degenerative changes in the spine. An 
hemangioma is noted in the T7 vertebral body. 
  
IMPRESSION IMPRESSION:  
1. No acute pulmonary embolism or other acute abnormality in the chest. A benign 
right pleural reflection account the peripheral density seen on chest 
radiograph. 
  
2. Hepatic steatosis. LENKA 11/18/18 SUMMARY: 
Procedure information: Sedation: Versed 3mg, fentanyl 25mcg. 15 minutes. Left ventricle: Systolic function was normal. Ejection fraction was 
estimated in the range of 55 % to 60 %. There were no regional wall motion 
abnormalities. Atrial septum: Negative bubble study. Mitral valve: No obvious mass, vegetation or thrombus noted. Aortic valve: No obvious mass, vegetation or thrombus noted. Aorta, systemic arteries: Visualized portions of aorta show mild 
atherosclerosis. Assessment / Plan: Mr Rupinder Vicente is a 60year old obese gentleman wt recent hx of dental work here with Ecoli bacteremia Denied any GI/ illness  
+ hitting head on dresser few weeks ago but would be unusual pathogen to come small scalp injury Has well water/septic tank at home 1) Ecoli bacteremia: source is not entirely clear Antibiotics adjusted to Ceftriaxone IV Await blood cx 11/21/18 Noted LENKA done and negative If blood cx 11/21 remains negative for 24-48 hours, ok for PICC and Ceftriaxone 2gm IV every day for 2 weeks till 12/5/18 While on IV antibiotics check weekly CBC wt diff, CMP and fax to me at [phone] DC PICC once antibiotics completed and no lifting > 10 lbs on PICC arm Will need education for IV home antibiotics If recurrent GNR bacteremia,would be concerned about super infection with Strongyloids but this is his first episode and denied any GI symptoms/travel out of the country Side effects of antibiotics including GI,renal,hematological,risk for CDI explained Colonoscopy recommended later on as he says he has never had one ( age >47) 2) R lung  lesion on CXR/CT reported as benign pleural reflection 3) DVT px on enoxiparin Patricia Parham DO 
1:04 PM

## 2018-11-22 NOTE — PROGRESS NOTES
Bedside shift change report given to Sarah (oncoming nurse) by Freddy Baker (offgoing nurse). Report included the following information SBAR, Kardex, Intake/Output, MAR, Recent Results and Cardiac Rhythm . Urine culture and paired blood cultures sent. No complaints of pain.

## 2018-11-22 NOTE — PROGRESS NOTES
Hospitalist Progress Note NAME: Yaneli Nixon :  1960 MRN:  [id number] This patient is at above high risk of deterioration based on documented presenting clinical data, comorbid conditions, high risk of adverse events and current acute care course. Assessment / Plan: 
Ecoli  bacteremia Sepsis (POA) due to ecoli in setting of recent dental work 
-recent dental work places him at risk for bacteremia and the presence of rigors, risk of IE 
- LENKA wnl - BC + ecoli sensitive to ceftriaxone, unclear etiology ( see ID note ) 
- repeat blood cultures negative so far  
- if negative BC by tomorrow  , will need PICC and 2 weeks of IV ceftriaxone - UA cloudy but negative , urine culture negative  
-up oob to moving 
-noted normal cta chest, cxr 
-ecg with RBBB -  Fu daily CBC Code status: Full Prophylaxis: Lovenox Recommended Disposition: Home w/Family Subjective: Chief Complaint / Reason for Physician Visit FU bacterimia /sepsis. Discussed with RN events overnight. No further chills, rigors. No fevers/chills/n/v/d. Review of Systems: 
Symptom Y/N Comments  Symptom Y/N Comments Fever/Chills n   Chest Pain n   
Poor Appetite n   Edema Cough n   Abdominal Pain n   
Sputum    Joint Pain SOB/SOLORZANO n   Pruritis/Rash Nausea/vomit n   Tolerating PT/OT Diarrhea n   Tolerating Diet y Constipation    Other Could NOT obtain due to:   
 
Objective: VITALS:  
Last 24hrs VS reviewed since prior progress note. Most recent are: 
Patient Vitals for the past 24 hrs: 
 Temp Pulse Resp BP SpO2  
18 1118 97.8 °F (36.6 °C) 78 14  98 % 18 0722 98 °F (36.7 °C) 97 18 127/75 96 % 18 0349 97.8 °F (36.6 °C) 70 20 106/60 98 % 18 2308 98 °F (36.7 °C) 69 20 117/77 97 % 18 1926 98.1 °F (36.7 °C) 80 18 130/56 95 % 18 1508 98 °F (36.7 °C) 84 22 139/60 95 % Intake/Output Summary (Last 24 hours) at 2018 1249 Last data filed at 11/22/2018 0184 Gross per 24 hour Intake 6707 ml Output  Net 6707 ml PHYSICAL EXAM: 
General: WD, obese m Alert, cooperative, no acute distress   
EENT:  EOMI. Anicteric sclerae. MM dry Resp:  CTA bilaterally, no wheezing or rales. No accessory muscle use CV:  Regular  rhythm,  No edema GI:  Soft, Non distended, Non tender.  +Bowel sounds Neurologic:  Alert and oriented X 3, normal speech Psych:   Good insight. Not anxious nor agitated Skin:  no rashes or ulcers. No jaundice Reviewed most current lab test results and cultures  YES Reviewed most current radiology test results   YES Review and summation of old records today    NO Reviewed patient's current orders and MAR    YES 
PMH/SH reviewed - no change compared to H&P 
________________________________________________________________________ Care Plan discussed with: 
  Comments Patient x Discussed with patient in room. POC discussed. Questions answered 15 Family RN x 5 Care Manager Consultant: x Review chart  
________________________________________________________________________ Total NON critical care TIME: 20   Minutes Total CRITICAL CARE TIME Spent:    
 
  Comments >50% of visit spent in counseling and coordination of care x See above  
________________________________________________________________________ Procedures: see electronic medical records for all procedures/Xrays and details which were not copied into this note but were reviewed prior to creation of Plan. LABS: 
Recent Labs  
  11/21/18 
0153 WBC 8.3 HGB 13.1 HCT 39.5  Recent Labs  
  11/22/18 
0403 11/21/18 
0153  140  
K 4.1 3.6 * 111* CO2 25 25 BUN 9 8  
CREA 0.87 0.91  
GLU 92 120* CA 7.8* 7.4* No results for input(s): SGOT, GPT, ALT, AP, TBIL, TBILI, TP, ALB, GLOB, GGT, AML, LPSE in the last 72 hours. No lab exists for component: AMYP, HLPSE No results for input(s): INR, PTP, APTT in the last 72 hours. No lab exists for component: INREXT, INREXT No results for input(s): FE, TIBC, PSAT, FERR in the last 72 hours. No results found for: FOL, RBCF No results for input(s): PH, PCO2, PO2 in the last 72 hours. No results for input(s): PHI, PO2I, PCO2I in the last 72 hours. No results for input(s): CPK, CKNDX, TROIQ in the last 72 hours. No lab exists for component: CPKMB No results found for: CHOL, CHOLX, CHLST, CHOLV, HDL, LDL, LDLC, DLDLP, TGLX, TRIGL, TRIGP, CHHD, CHHDX No results found for: Roro Balsam Lab Results Component Value Date/Time Color YELLOW/STRAW 11/18/2018 04:15 PM  
 Appearance CLOUDY (A) 11/18/2018 04:15 PM  
 Specific gravity 1.016 11/18/2018 04:15 PM  
 pH (UA) 5.0 11/18/2018 04:15 PM  
 Protein 30 (A) 11/18/2018 04:15 PM  
 Glucose NEGATIVE  11/18/2018 04:15 PM  
 Ketone NEGATIVE  11/18/2018 04:15 PM  
 Bilirubin NEGATIVE  11/18/2018 04:15 PM  
 Urobilinogen 0.2 11/18/2018 04:15 PM  
 Nitrites NEGATIVE  11/18/2018 04:15 PM  
 Leukocyte Esterase NEGATIVE  11/18/2018 04:15 PM  
 Epithelial cells FEW 11/18/2018 04:15 PM  
 Bacteria NEGATIVE  11/18/2018 04:15 PM  
 WBC 0-4 11/18/2018 04:15 PM  
 RBC 0-5 11/18/2018 04:15 PM  
 
 
RADIOGRAPHIC STUDIES: 
CXR Results  (Last 48 hours) None CT Results  (Last 48 hours) None Echo Results  (Last 48 hours) None VENOUS DOPPLER results  (Last 48 hours) None CULTURES: 
 
No results found for: SDES Lab Results Component Value Date/Time Culture result: NO GROWTH AFTER 19 HOURS 11/21/2018 11:17 AM  
 Culture result: NO GROWTH 1 DAY 11/21/2018 08:00 AM  
 Culture result: (A) 11/18/2018 09:48 PM  
  ESCHERICHIA COLI GROWING IN 2 OF 4 BOTTLES DRAWN (SITES = LAC AND L HAND) Culture result: REMAINING BOTTLE(S) HAS/HAVE NO GROWTH SO FAR 11/18/2018 09:48 PM  
  
 
 
Signed: Margoth Ulloa MD 
 
This note will not be viewable in [address]

## 2018-11-22 NOTE — PROGRESS NOTES
Problem: Falls - Risk of 
Goal: *Absence of Falls Document Sarah CavanaughNeto Fall Risk and appropriate interventions in the flowsheet. Outcome: Progressing Towards Goal 
Fall Risk Interventions: 
  
 
  
 
Medication Interventions: Teach patient to arise slowly History of Falls Interventions: Investigate reason for fall

## 2018-11-23 VITALS
RESPIRATION RATE: 22 BRPM | TEMPERATURE: 98.3 F | WEIGHT: 303.79 LBS | BODY MASS INDEX: 48.82 KG/M2 | HEART RATE: 86 BPM | OXYGEN SATURATION: 96 % | DIASTOLIC BLOOD PRESSURE: 73 MMHG | SYSTOLIC BLOOD PRESSURE: 144 MMHG | HEIGHT: 66 IN

## 2018-11-23 LAB
ANION GAP SERPL CALC-SCNC: 6 MMOL/L (ref 5–15)
BUN SERPL-MCNC: 9 MG/DL (ref 6–20)
BUN/CREAT SERPL: 10 (ref 12–20)
CALCIUM SERPL-MCNC: 8 MG/DL (ref 8.5–10.1)
CHLORIDE SERPL-SCNC: 111 MMOL/L (ref 97–108)
CO2 SERPL-SCNC: 26 MMOL/L (ref 21–32)
CREAT SERPL-MCNC: 0.92 MG/DL (ref 0.7–1.3)
GLUCOSE SERPL-MCNC: 119 MG/DL (ref 65–100)
POTASSIUM SERPL-SCNC: 3.8 MMOL/L (ref 3.5–5.1)
SODIUM SERPL-SCNC: 143 MMOL/L (ref 136–145)

## 2018-11-23 PROCEDURE — 76937 US GUIDE VASCULAR ACCESS: CPT

## 2018-11-23 PROCEDURE — C1751 CATH, INF, PER/CENT/MIDLINE: HCPCS

## 2018-11-23 PROCEDURE — 77030018786 HC NDL GD F/USND BARD -B

## 2018-11-23 PROCEDURE — 74011000258 HC RX REV CODE- 258: Performed by: INTERNAL MEDICINE

## 2018-11-23 PROCEDURE — 77030018719 HC DRSG PTCH ANTIMIC J&J -A

## 2018-11-23 PROCEDURE — 74011250636 HC RX REV CODE- 250/636: Performed by: INTERNAL MEDICINE

## 2018-11-23 PROCEDURE — 80048 BASIC METABOLIC PNL TOTAL CA: CPT

## 2018-11-23 PROCEDURE — 36415 COLL VENOUS BLD VENIPUNCTURE: CPT

## 2018-11-23 RX ORDER — SODIUM CHLORIDE 0.9 % (FLUSH) 0.9 %
10 SYRINGE (ML) INJECTION EVERY 24 HOURS
Status: CANCELLED | OUTPATIENT
Start: 2018-11-23

## 2018-11-23 RX ORDER — SODIUM CHLORIDE 0.9 % (FLUSH) 0.9 %
10-40 SYRINGE (ML) INJECTION EVERY 8 HOURS
Status: CANCELLED | OUTPATIENT
Start: 2018-11-23

## 2018-11-23 RX ORDER — BACITRACIN 500 UNIT/G
1 PACKET (EA) TOPICAL AS NEEDED
Status: CANCELLED | OUTPATIENT
Start: 2018-11-23

## 2018-11-23 RX ORDER — HEPARIN 100 UNIT/ML
300 SYRINGE INTRAVENOUS AS NEEDED
Status: CANCELLED | OUTPATIENT
Start: 2018-11-23

## 2018-11-23 RX ORDER — SODIUM CHLORIDE 0.9 % (FLUSH) 0.9 %
10-30 SYRINGE (ML) INJECTION AS NEEDED
Status: CANCELLED | OUTPATIENT
Start: 2018-11-23

## 2018-11-23 RX ORDER — SODIUM CHLORIDE 0.9 % (FLUSH) 0.9 %
10-40 SYRINGE (ML) INJECTION AS NEEDED
Status: DISCONTINUED | OUTPATIENT
Start: 2018-11-23 | End: 2018-11-23 | Stop reason: HOSPADM

## 2018-11-23 RX ADMIN — SODIUM CHLORIDE 126 ML/HR: 900 INJECTION, SOLUTION INTRAVENOUS at 01:16

## 2018-11-23 RX ADMIN — ENOXAPARIN SODIUM 40 MG: 40 INJECTION, SOLUTION INTRAVENOUS; SUBCUTANEOUS at 01:16

## 2018-11-23 RX ADMIN — CEFTRIAXONE SODIUM 2 G: 2 INJECTION, POWDER, FOR SOLUTION INTRAMUSCULAR; INTRAVENOUS at 12:52

## 2018-11-23 RX ADMIN — ENOXAPARIN SODIUM 40 MG: 40 INJECTION, SOLUTION INTRAVENOUS; SUBCUTANEOUS at 11:32

## 2018-11-23 NOTE — PROGRESS NOTES
MIDLINE Insertion Procedure  Note:  
 
Procedure explained to  pt  along with risks and benefits. Procedure teaching completed. Pre procedure assessment done. Pt requested place midline to left arm. Maximum sterile barrier precautions observed throughout procedure. Lidocaine 1 %  3.0   ml sc injected to site prior to access the vein . Cannulated   cephalic   vein using ultrasound guidance. Inserted  4   Fr. Single   lumen midline to   left    arm. Blood return verified and  flushed with 20ml normal saline  to  port. Sterile dressing applied with biopatch, statLock and occlusive dressing as per protocol. Curos cap applied to port. Patient tolerated procedure well with minimal blood loss. Reason for access : Reliable IV Access / MD ordered / Home IV antibiotics for 12 days / Discharge with midline. Complications related to insertion : None This midline to be removed on or before  12/22/2018 See nursing message  for midline reminders Inserted by : Isaías Feng RN. NELLY. CMSRN. PICC Nurse Assisted by : Andrea Mckeon RN PICC Nurse Total Length : 13  cm External Length :  0     cm Arm circumference :    39   cm Catheter occupies   14   % of vein. Type of Midline:  B[employer] Midline Ref#:  [id number] Lot#:   [id number] Expiration Date:    2020-02-29 Isaías Feng RN. NELLY. CMSRN. PICC nurse.  Vascular Access Team

## 2018-11-23 NOTE — DISCHARGE INSTRUCTIONS
DISCHARGE DIAGNOSIS:  Ecoli  bacteremia  Sepsis (POA) due to ecoli in setting of recent dental work    MEDICATIONS:  · It is important that you take the medication exactly as they are prescribed. · Keep your medication in the bottles provided by the pharmacist and keep a list of the medication names, dosages, and times to be taken in your wallet. · Do not take other medications without consulting your doctor. Pain Management: per above medications    What to do at Home  - you will get Iv ceftriaxone once a day for 12days   - you need a weekly lab CBC with diff and CMP , results to be faxed to Dr Gabriela Kee         Recommended diet:  Regular Diet    Recommended activity: Activity as tolerated    If you have questions regarding the hospital related prescriptions or hospital related issues please call [address] [place] [address] at . You can always direct your questions to your primary care doctor if you are unable to reach your hospital physician; your PCP works as an extension of your hospital doctor just like your hospital doctor is an extension of your PCP for your time at the hospital [facility], [facility]).     If you experience any of the following symptoms then please call your primary care physician or return to the emergency room if you cannot get hold of your doctor:  Fever, chills, nausea, vomiting, diarrhea, change in mentation, falling, bleeding, shortness of breath,

## 2018-11-23 NOTE — PROGRESS NOTES
PCP GI appt scheduled with Dr. Jose Juan Paulson on 11/27/2018 at 12:00pm. Appt added to AVS. Renzo Rey CM Specialist

## 2018-11-23 NOTE — PROGRESS NOTES
Bedside shift change report given to Darin Hair (oncoming nurse) by Luis Manzano (offgoing nurse). Report included the following information SBAR, Kardex, Intake/Output and Recent Results. 0720: Bedside shift change report given to Hira Slater RN (oncoming nurse) by Lexii Baez RN (offgoing nurse). Report included the following information SBAR, Kardex, Intake/Output and Recent Results.

## 2018-11-23 NOTE — PROGRESS NOTES
[address] wonders if pt is homebournd. May need OPIC? Pt hasn't seen PCP. Would need ID to sign orders. 1030  Discussed with MD.  Apparently, per Yandel Gaston at [facility], they do not need HHC nor do they need pt to be homebound, if he has a commercial insurance. Pt works daily until 11a so would need abx  In the afternoon. I left a VM with [facility] center and am awaiting HCP to talk with pt. 
 
4736  HCP is providing the teaching as we speak. They were unable to provide the [facility] so AT [facility] is willing to provide as long as pt signs an out of pocket agreement to pay for the service if the insurance won't cover. The insurance company is closed today. He may be discharged from my perspective.

## 2018-11-23 NOTE — PROGRESS NOTES
IV abx consult for home noted. Will send to [facility]. Unsure if they will be able to arrange this until next week. 09  Order for nursing//labs sent to [facility].

## 2018-11-23 NOTE — PROGRESS NOTES
Bedside and Verbal shift change report given to Amanda Vera (oncoming nurse) by Aleks Renteria (offgoing nurse). Report included the following information SBAR, Kardex, Intake/Output, MAR, Accordion, Recent Results and Cardiac Rhythm NSR.

## 2018-11-23 NOTE — DISCHARGE SUMMARY
Hospitalist Discharge Summary    NAME: Mirian Talley   :  1960   MRN:  [id number]     DISCHARGE DIAGNOSIS:  Ecoli  bacteremia  Sepsis (POA) due to ecoli in setting of recent dental work    CONSULTATIONS:  ID    Follow Up: Follow-up Information     Follow up With Specialties Details Why Contact Info    Randi Leiva MD [facility] In 7 days  [address]  [phone]      Tresa Kirk DO Infectious Diseases   [address]. [phone]            Procedures: see electronic medical records for all procedures/Xrays and details which were not copied into this note but were reviewed prior to creation of Plan. Please follow-up tests/labs that are still pendin. None     PMH/SH reviewed - no change compared to H&P    DISCHARGE SUMMARY/HOSPITAL COURSE: for full details see H&P, daily progress notes, labs, consult notes. Briefly As Per HPI:from Dr Alexis Canas H&P    Areli Jacobson is a 60 y.o.  male presents ambulatory to the ED with cc of gradual onset chills and tremors, x day while at home-.pt takes no meds. Otherwise with no sig past medical history. no history of drug use.non smoker. Non drinker.    Pt reports that tremors were \"uncontrolled\"  And lasted for ~30 to 45 minutes.  Pt reports that he was at baseline prior to episode.    Pt reports that he is currently fatigued by tremor episode.  Pt reports during episode he was having diaphoresis and SOB. denies HA, or nausea. Pt reports having dental fillings done on 2018.  Pt denies fever, hx of blood clots, or recent travel.   No c/p no   No uti sx.      The patient's hospital course was complicated by:    Stephane Monahanyer  bacteremia  Sepsis (POA) due to ecoli in setting of recent dental work  -recent dental work places him at risk for bacteremia and the presence of rigors, risk of IE  - LENKA wnl   - BC + ecoli sensitive to ceftriaxone, unclear etiology   - repeat blood cultures negative so far   - repeat  Blood cultures remains negative   , Midline placed , c/w IV ceftriaxone for 12more days . Midline has to be discontinued after antibiotics   - Need weekly CBC with diff, CMP and results to be faxed to [facility]   - urine culture negative   -noted normal cta chest, cxr  -ecg with RBBB    Patient with complex inpatient course. Please see all notes, labs, vitals, testing and procedures for details, briefly the patient was admitted following presentation to ED with chills and tremor few days after a dental work . He was found to have sepsis 2/2  Ecoli bacteriemia . Pt was treated with IV ceftriaxone, need to complete a 12day course . Pt stable for discharge. Weekly CBC with diff and CMP to be ordered by PCP.  _______________________________________________________________________   Patient seen and examined by me on day of discharge. Pertinent findings are:  Gen:NAD   HEENT:NC/AT  Chest:CTAB  Cv:RRR s1s2 wnl , No RMG   Abd:NT ND   Neuro:AAOx3    See Discharge Instructions for further details. _______________________________________________________________________    Medications Reviewed:  Current Discharge Medication List      START taking these medications    Details   cefTRIAXone 2 gram 2 g, ADDaptor 1 Device IVPB 2 g by IntraVENous route every twenty-four (24) hours for 12 days.   Qty: 12 Dose, Refills: 0         STOP taking these medications       ibuprofen 200 mg cap Comments:   Reason for Stopping:             _______________________________________________________________________    Risk of deterioration: Low  ________________________________________________________________________    Disposition  Home with family and home health services  ________________________________________________________________________    Care Plan discussed with:   Patient, Family, RN, Care Manager, Consultant  ________________________________________________________________________    Code Status: Full Code  ________________________________________________________________________    Total time spent in discharge (min): 30   ________________________________________________________________________    CDMP Checked: Yes    Signed: Hailee Willis MD    This note will not be viewable in [address]

## 2018-11-23 NOTE — PROGRESS NOTES
Bedside and Verbal shift change report given to [address] [place] [address] (oncoming nurse) by [facility] (offgoing nurse). Report included the following information SBAR, Kardex, MAR and Accordion.

## 2018-11-23 NOTE — PROGRESS NOTES
Patient discharged to home spouse and PCT via wheelchair. Discharge information reviewed with patient/spouse. Prescription for Rocephin explained and able to readback prescription use. No apparent manifestations upon leaving unit. Xiang Balderrama

## 2018-11-24 LAB
BACTERIA SPEC CULT: ABNORMAL
BACTERIA SPEC CULT: ABNORMAL
SERVICE CMNT-IMP: ABNORMAL

## 2018-11-24 NOTE — PROGRESS NOTES
Spiritual Care Partner Volunteer visited patient in [address] on November 23, 2018. Documented on November 24, 2018 by: 
DIVINE Reynolds, [facility], [facility] Paging Service  [phone]

## 2018-11-26 LAB
BACTERIA SPEC CULT: NORMAL
SERVICE CMNT-IMP: NORMAL

## 2018-11-26 NOTE — PROGRESS NOTES
AGNES received a call from Mariela león At [facility]. They have been unable to reach Pt at home. AGNES gave her all the numbers that we have on face sheet. CM also let her know to call PCP office Dr. Tila Baker to see if they have any additional numbers. Froylan Izquierdo, AGNES Ext [phone]

## 2018-11-27 ENCOUNTER — OFFICE VISIT (OUTPATIENT)
Dept: FAMILY MEDICINE CLINIC | Age: 58
End: 2018-11-27

## 2018-11-27 VITALS
DIASTOLIC BLOOD PRESSURE: 82 MMHG | TEMPERATURE: 97.4 F | WEIGHT: 302.14 LBS | SYSTOLIC BLOOD PRESSURE: 114 MMHG | BODY MASS INDEX: 48.56 KG/M2 | OXYGEN SATURATION: 97 % | RESPIRATION RATE: 20 BRPM | HEART RATE: 96 BPM | HEIGHT: 66 IN

## 2018-11-27 DIAGNOSIS — E66.01 OBESITY, MORBID (HCC): ICD-10-CM

## 2018-11-27 DIAGNOSIS — R65.10 SIRS (SYSTEMIC INFLAMMATORY RESPONSE SYNDROME) (HCC): Primary | ICD-10-CM

## 2018-11-27 NOTE — PROGRESS NOTES
HISTORY OF PRESENT ILLNESS Alka Patterson is a 60 y.o. male. HPI Pt of Dr Justina Trammell last time seen him was >10yrs ago, Today visit mostly is a juan m SIRS from E coli infection to make sure that there is medication compliance, that there is no sign or sxs of DVT, nor sign and sxs for infection, pt states that he never had trouble with urination, just had some chills, no fhx of prostate cancer,  
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 12 Dose 0 No Known Allergies No past medical history on file. No past surgical history on file. No family history on file. Social History Tobacco Use  Smoking status: Not on file Substance Use Topics  Alcohol use: Not on file Lab Results Component Value Date/Time WBC 8.3 11/21/2018 01:53 AM  
 HGB 13.1 11/21/2018 01:53 AM  
 HCT 39.5 11/21/2018 01:53 AM  
 PLATELET 411 07/40/1496 01:53 AM  
 MCV 89.6 11/21/2018 01:53 AM  
  
Review of Systems Constitutional: Negative for chills, fever and malaise/fatigue. HENT: Negative for ear pain and nosebleeds. Eyes: Negative for blurred vision, pain and discharge. Respiratory: Negative for shortness of breath. Cardiovascular: Negative for chest pain and leg swelling. Gastrointestinal: Negative for constipation, diarrhea, nausea and vomiting. Genitourinary: Negative for frequency. Musculoskeletal: Negative for joint pain. Skin: Negative for itching and rash. Neurological: Negative for headaches. Psychiatric/Behavioral: Negative for depression. The patient is not nervous/anxious. Physical Exam  
Constitutional: He is oriented to person, place, and time. He appears well-developed and well-nourished. HENT:  
Head: Normocephalic and atraumatic. Mouth/Throat: No oropharyngeal exudate. Eyes: Conjunctivae and EOM are normal.  
Neck: Normal range of motion. Neck supple. Cardiovascular: Normal rate, regular rhythm and normal heart sounds. No murmur heard. Pulmonary/Chest: Effort normal and breath sounds normal. No respiratory distress. Abdominal: Soft. Bowel sounds are normal. He exhibits no distension. There is no rebound. Musculoskeletal: He exhibits no edema or tenderness. Neurological: He is alert and oriented to person, place, and time. Skin: Skin is warm. No erythema. Psychiatric: He has a normal mood and affect. His behavior is normal.  
Nursing note and vitals reviewed. ASSESSMENT and PLAN Diagnoses and all orders for this visit: 1. SIRS (systemic inflammatory response syndrome) (HCC) 2. Obesity, morbid ([address] Pt would need rtc if he likes in 4 wks for other testing, At this time patient was told to lose weight, so that the current body mass index would get into a close to 25 or between 20-25, patient was told that the patient can achieve this by starting an active life style modifications, working on the diet, increase physical activity, limit alcohol consumption, stop secondhand tobacco exposure,    for which includes creating a an interesting delightful to do list,  such as start of a light physical activity with a brisk daily walking 30 minutes most days of the week, most likely to total of 150 minutes per week, Routine labs ordered, and the needed abnormal labs will be discussed soon and they can be repeated in 3-6 months. In addition relevant handouts were given to the patient for a better understanding, 
 
patient was told to call if any problems. Patient acknowledged understanding and  agreed with today's recommendations. Discussed the patient's BMI with him. The BMI follow up plan is as follows:  
 
dietary management education, guidance, and counseling 
encourage exercise 
monitor weight 
prescribed dietary intake An After Visit Summary was printed and given to the patient.

## 2018-11-27 NOTE — PATIENT INSTRUCTIONS
Body Mass Index: Care Instructions Your Care Instructions Body mass index (BMI) can help you see if your weight is raising your risk for health problems. It uses a formula to compare how much you weigh with how tall you are. · A BMI lower than 18.5 is considered underweight. · A BMI between 18.5 and 24.9 is considered healthy. · A BMI between 25 and 29.9 is considered overweight. A BMI of 30 or higher is considered obese. If your BMI is in the normal range, it means that you have a lower risk for weight-related health problems. If your BMI is in the overweight or obese range, you may be at increased risk for weight-related health problems, such as high blood pressure, heart disease, stroke, arthritis or joint pain, and diabetes. If your BMI is in the underweight range, you may be at increased risk for health problems such as fatigue, lower protection (immunity) against illness, muscle loss, bone loss, hair loss, and hormone problems. BMI is just one measure of your risk for weight-related health problems. You may be at higher risk for health problems if you are not active, you eat an unhealthy diet, or you drink too much alcohol or use tobacco products. Follow-up care is a key part of your treatment and safety. Be sure to make and go to all appointments, and call your doctor if you are having problems. It's also a good idea to know your test results and keep a list of the medicines you take. How can you care for yourself at home? · Practice healthy eating habits. This includes eating plenty of fruits, vegetables, whole grains, lean protein, and low-fat dairy. · If your doctor recommends it, get more exercise. Walking is a good choice. Bit by bit, increase the amount you walk every day. Try for at least 30 minutes on most days of the week. · Do not smoke. Smoking can increase your risk for health problems.  If you need help quitting, talk to your doctor about stop-smoking programs and medicines. These can increase your chances of quitting for good. · Limit alcohol to 2 drinks a day for men and 1 drink a day for women. Too much alcohol can cause health problems. If you have a BMI higher than 25 · Your doctor may do other tests to check your risk for weight-related health problems. This may include measuring the distance around your waist. A waist measurement of more than 40 inches in men or 35 inches in women can increase the risk of weight-related health problems. · Talk with your doctor about steps you can take to stay healthy or improve your health. You may need to make lifestyle changes to lose weight and stay healthy, such as changing your diet and getting regular exercise. If you have a BMI lower than 18.5 · Your doctor may do other tests to check your risk for health problems. · Talk with your doctor about steps you can take to stay healthy or improve your health. You may need to make lifestyle changes to gain or maintain weight and stay healthy, such as getting more healthy foods in your diet and doing exercises to build muscle. Where can you learn more? Go to http://kayla-luanne.info/. Enter S176 in the search box to learn more about \"Body Mass Index: Care Instructions. \" Current as of: October 13, 2016 Content Version: 11.4 © 1109-1196 [employer]. Care instructions adapted under license by [employer] (which disclaims liability or warranty for this information). If you have questions about a medical condition or this instruction, always ask your healthcare professional. [address] any warranty or liability for your use of this information.

## 2018-12-04 ENCOUNTER — TELEPHONE (OUTPATIENT)
Dept: FAMILY MEDICINE CLINIC | Age: 58
End: 2018-12-04

## 2018-12-04 ENCOUNTER — DOCUMENTATION ONLY (OUTPATIENT)
Dept: FAMILY MEDICINE CLINIC | Age: 58
End: 2018-12-04

## 2018-12-04 DIAGNOSIS — B99.9 INFECTION: Primary | ICD-10-CM

## 2018-12-04 NOTE — TELEPHONE ENCOUNTER
Called and spoke to Mr Araceli Mckinney Says he is doing really well Asymptomatic PiCC site clean and not painful /no edema, erythema Denied any fever, chills, abd pain, dysuria Says has had soft stool but no stacey diarrhea WBC up to 17?? Will have cbc wt diff and cmp done a week later to trend I have told him to call me if any fever, chills or any complaints as he is finishing antibiotics tommorrow He agrees to come  RX for labs a week later from our office.

## 2018-12-04 NOTE — TELEPHONE ENCOUNTER
Pls call Annemarie Stewart with [employer] Need updated orders as medication finishes 12/5/18 Best number to reach her is [phone]

## 2018-12-04 NOTE — PROGRESS NOTES
Called to speak to Mr Hilda Ornelas WBC on last blood work that I received today WBC 17 Unable to reach him Left a voicemail to reach me back Byron Smith DO 
11:56 AM

## 2018-12-11 ENCOUNTER — TELEPHONE (OUTPATIENT)
Dept: FAMILY MEDICINE CLINIC | Age: 58
End: 2018-12-11

## 2018-12-11 DIAGNOSIS — B99.9 INFECTION: ICD-10-CM

## 2018-12-11 NOTE — TELEPHONE ENCOUNTER
Pt states he needs lab orders    He would like to get it done tomorrow      Best number to reach him is [phone]

## 2018-12-11 NOTE — TELEPHONE ENCOUNTER
Called and spoke with patient. Pt was informed lab slip is at  for him to . He verbalized understanding.

## 2018-12-13 LAB
ALBUMIN SERPL-MCNC: 4 G/DL (ref 3.5–5.5)
ALBUMIN/GLOB SERPL: 1.4 {RATIO} (ref 1.2–2.2)
ALP SERPL-CCNC: 103 IU/L (ref 39–117)
ALT SERPL-CCNC: 37 IU/L (ref 0–44)
AST SERPL-CCNC: 33 IU/L (ref 0–40)
BASOPHILS # BLD AUTO: 0 X10E3/UL (ref 0–0.2)
BASOPHILS NFR BLD AUTO: 0 %
BILIRUB SERPL-MCNC: <0.2 MG/DL (ref 0–1.2)
BUN SERPL-MCNC: 15 MG/DL (ref 6–24)
BUN/CREAT SERPL: 14 (ref 9–20)
CALCIUM SERPL-MCNC: 8.9 MG/DL (ref 8.7–10.2)
CHLORIDE SERPL-SCNC: 103 MMOL/L (ref 96–106)
CO2 SERPL-SCNC: 25 MMOL/L (ref 20–29)
CREAT SERPL-MCNC: 1.1 MG/DL (ref 0.76–1.27)
EOSINOPHIL # BLD AUTO: 0.2 X10E3/UL (ref 0–0.4)
EOSINOPHIL NFR BLD AUTO: 2 %
ERYTHROCYTE [DISTWIDTH] IN BLOOD BY AUTOMATED COUNT: 14.8 % (ref 12.3–15.4)
GLOBULIN SER CALC-MCNC: 2.9 G/DL (ref 1.5–4.5)
GLUCOSE SERPL-MCNC: 118 MG/DL (ref 65–99)
HCT VFR BLD AUTO: 42.3 % (ref 37.5–51)
HGB BLD-MCNC: 13.7 G/DL (ref 13–17.7)
IMM GRANULOCYTES # BLD: 0 X10E3/UL (ref 0–0.1)
IMM GRANULOCYTES NFR BLD: 0 %
LYMPHOCYTES # BLD AUTO: 2.6 X10E3/UL (ref 0.7–3.1)
LYMPHOCYTES NFR BLD AUTO: 26 %
MCH RBC QN AUTO: 29.2 PG (ref 26.6–33)
MCHC RBC AUTO-ENTMCNC: 32.4 G/DL (ref 31.5–35.7)
MCV RBC AUTO: 90 FL (ref 79–97)
MONOCYTES # BLD AUTO: 0.6 X10E3/UL (ref 0.1–0.9)
MONOCYTES NFR BLD AUTO: 6 %
NEUTROPHILS # BLD AUTO: 6.6 X10E3/UL (ref 1.4–7)
NEUTROPHILS NFR BLD AUTO: 66 %
PLATELET # BLD AUTO: 269 X10E3/UL (ref 150–379)
POTASSIUM SERPL-SCNC: 4.6 MMOL/L (ref 3.5–5.2)
PROT SERPL-MCNC: 6.9 G/DL (ref 6–8.5)
RBC # BLD AUTO: 4.69 X10E6/UL (ref 4.14–5.8)
SODIUM SERPL-SCNC: 141 MMOL/L (ref 134–144)
WBC # BLD AUTO: 10.1 X10E3/UL (ref 3.4–10.8)